# Patient Record
Sex: FEMALE | Race: WHITE | NOT HISPANIC OR LATINO | Employment: FULL TIME | URBAN - METROPOLITAN AREA
[De-identification: names, ages, dates, MRNs, and addresses within clinical notes are randomized per-mention and may not be internally consistent; named-entity substitution may affect disease eponyms.]

---

## 2023-04-03 ENCOUNTER — OFFICE VISIT (OUTPATIENT)
Dept: PRIMARY CARE | Facility: CLINIC | Age: 38
End: 2023-04-03
Payer: COMMERCIAL

## 2023-04-03 ENCOUNTER — TELEPHONE (OUTPATIENT)
Dept: PRIMARY CARE | Facility: CLINIC | Age: 38
End: 2023-04-03
Payer: COMMERCIAL

## 2023-04-03 VITALS
TEMPERATURE: 98.1 F | BODY MASS INDEX: 36.12 KG/M2 | SYSTOLIC BLOOD PRESSURE: 110 MMHG | HEART RATE: 68 BPM | HEIGHT: 60 IN | DIASTOLIC BLOOD PRESSURE: 70 MMHG | WEIGHT: 184 LBS

## 2023-04-03 DIAGNOSIS — G43.111 INTRACTABLE MIGRAINE WITH AURA WITH STATUS MIGRAINOSUS: Primary | ICD-10-CM

## 2023-04-03 PROCEDURE — 1036F TOBACCO NON-USER: CPT | Performed by: INTERNAL MEDICINE

## 2023-04-03 PROCEDURE — 99213 OFFICE O/P EST LOW 20 MIN: CPT | Performed by: INTERNAL MEDICINE

## 2023-04-03 RX ORDER — DICYCLOMINE HYDROCHLORIDE 20 MG/1
20 TABLET ORAL 3 TIMES DAILY PRN
COMMUNITY
Start: 2022-05-06 | End: 2024-01-04 | Stop reason: WASHOUT

## 2023-04-03 RX ORDER — RIZATRIPTAN BENZOATE 10 MG/1
10 TABLET ORAL ONCE AS NEEDED
Qty: 9 TABLET | Refills: 3 | Status: SHIPPED | OUTPATIENT
Start: 2023-04-03 | End: 2023-12-07 | Stop reason: ALTCHOICE

## 2023-04-03 RX ORDER — SODIUM FLUORIDE1.1%, POTASSIUM NITRATE 5% 5.8; 57.5 MG/ML; MG/ML
GEL, DENTIFRICE DENTAL
COMMUNITY
Start: 2023-01-30

## 2023-04-03 RX ORDER — EPINEPHRINE 0.3 MG/.3ML
1 INJECTION SUBCUTANEOUS
COMMUNITY
Start: 2019-10-10

## 2023-04-03 RX ORDER — ALBUTEROL SULFATE 90 UG/1
2 AEROSOL, METERED RESPIRATORY (INHALATION) EVERY 6 HOURS PRN
COMMUNITY
Start: 2023-01-13

## 2023-04-03 RX ORDER — ESCITALOPRAM OXALATE 10 MG/1
10 TABLET ORAL DAILY
COMMUNITY
Start: 2023-01-23 | End: 2023-07-28 | Stop reason: SDUPTHER

## 2023-04-03 ASSESSMENT — ENCOUNTER SYMPTOMS
CONSTITUTIONAL NEGATIVE: 1
RESPIRATORY NEGATIVE: 1
EYES NEGATIVE: 1
CARDIOVASCULAR NEGATIVE: 1
PSYCHIATRIC NEGATIVE: 1
HEADACHES: 1

## 2023-04-03 NOTE — ASSESSMENT & PLAN NOTE
Patient is having migraines which have intensified in the last few weeks she has lot of stress from the job.  She has been using caffeine to stay awake which is contributing she took good 2 Excedrin's yesterday without much effect.  Her most recent episode has been there for about 48 hours..  She is currently having a menstrual period and that also is a trigger factor.  We advised her to get good quality sleep minimize her caffeine and stimulants advised her to take magnesium oxide also I have sent prescription of Maxalt and she can repeat it in 2 hours also discussed with her about medication overuse headache she may eventually need referral to headache specialist Dr. Wang.Also maintain headache journal.

## 2023-04-03 NOTE — PROGRESS NOTES
Subjective   Patient ID: Teodora Allen is a 37 y.o. female who presents for Migraine (Patient here for migraine x 1 day, headache since Thursday, nausea vomiting , some vertigo along with light, noise and smell sensitivity).    Migraine          Review of Systems   Constitutional: Negative.    HENT: Negative.     Eyes: Negative.    Respiratory: Negative.     Cardiovascular: Negative.    Genitourinary: Negative.    Neurological:  Positive for headaches.   Psychiatric/Behavioral: Negative.     All other systems reviewed and are negative.      Objective   /70   Pulse 68   Temp 36.7 °C (98.1 °F) (Temporal)   Ht 1.524 m (5')   Wt 83.5 kg (184 lb)   LMP 04/02/2023 Comment: currently on  BMI 35.94 kg/m²     Physical Exam  Constitutional:       Appearance: Normal appearance.   HENT:      Head: Normocephalic and atraumatic.   Eyes:      Conjunctiva/sclera: Conjunctivae normal.      Pupils: Pupils are equal, round, and reactive to light.   Cardiovascular:      Rate and Rhythm: Normal rate and regular rhythm.      Heart sounds: Normal heart sounds.   Pulmonary:      Effort: Pulmonary effort is normal.      Breath sounds: Normal breath sounds.   Musculoskeletal:         General: Normal range of motion.   Neurological:      General: No focal deficit present.      Mental Status: She is alert and oriented to person, place, and time.   Psychiatric:         Mood and Affect: Mood normal.         Thought Content: Thought content normal.         Judgment: Judgment normal.         Assessment/Plan   Problem List Items Addressed This Visit          Other    Intractable migraine with aura with status migrainosus - Primary     Patient is having migraines which have intensified in the last few weeks she has lot of stress from the job.  She has been using caffeine to stay awake which is contributing she took good 2 Excedrin's yesterday without much effect.  Her most recent episode has been there for about 48 hours..  She is  currently having a menstrual period and that also is a trigger factor.  We advised her to get good quality sleep minimize her caffeine and stimulants advised her to take magnesium oxide also I have sent prescription of Maxalt and she can repeat it in 2 hours also discussed with her about medication overuse headache she may eventually need referral to headache specialist Dr. Wang.Also maintain headache journal.         Relevant Medications    rizatriptan (Maxalt) 10 mg tablet

## 2023-05-03 ENCOUNTER — APPOINTMENT (OUTPATIENT)
Dept: PRIMARY CARE | Facility: CLINIC | Age: 38
End: 2023-05-03
Payer: COMMERCIAL

## 2023-05-12 ENCOUNTER — APPOINTMENT (OUTPATIENT)
Dept: PRIMARY CARE | Facility: CLINIC | Age: 38
End: 2023-05-12
Payer: COMMERCIAL

## 2023-07-28 ENCOUNTER — OFFICE VISIT (OUTPATIENT)
Dept: PRIMARY CARE | Facility: CLINIC | Age: 38
End: 2023-07-28
Payer: COMMERCIAL

## 2023-07-28 VITALS
SYSTOLIC BLOOD PRESSURE: 110 MMHG | HEART RATE: 88 BPM | DIASTOLIC BLOOD PRESSURE: 70 MMHG | BODY MASS INDEX: 37.11 KG/M2 | WEIGHT: 189 LBS | HEIGHT: 60 IN | TEMPERATURE: 98 F | OXYGEN SATURATION: 98 %

## 2023-07-28 DIAGNOSIS — F41.9 ANXIETY: ICD-10-CM

## 2023-07-28 DIAGNOSIS — G43.839 INTRACTABLE MENSTRUAL MIGRAINE WITHOUT STATUS MIGRAINOSUS: Primary | ICD-10-CM

## 2023-07-28 PROBLEM — G43.009 MIGRAINE WITHOUT AURA: Status: ACTIVE | Noted: 2023-07-28

## 2023-07-28 PROBLEM — G43.009 MIGRAINE WITHOUT AURA: Status: RESOLVED | Noted: 2023-07-28 | Resolved: 2023-07-28

## 2023-07-28 PROCEDURE — 1036F TOBACCO NON-USER: CPT | Performed by: INTERNAL MEDICINE

## 2023-07-28 PROCEDURE — 99213 OFFICE O/P EST LOW 20 MIN: CPT | Performed by: INTERNAL MEDICINE

## 2023-07-28 RX ORDER — ESCITALOPRAM OXALATE 10 MG/1
10 TABLET ORAL DAILY
Qty: 90 TABLET | Refills: 3 | Status: SHIPPED | OUTPATIENT
Start: 2023-07-28 | End: 2023-11-06 | Stop reason: WASHOUT

## 2023-07-28 ASSESSMENT — ENCOUNTER SYMPTOMS
CARDIOVASCULAR NEGATIVE: 1
CONSTITUTIONAL NEGATIVE: 1
MUSCULOSKELETAL NEGATIVE: 1
RESPIRATORY NEGATIVE: 1
GASTROINTESTINAL NEGATIVE: 1

## 2023-07-28 NOTE — PROGRESS NOTES
"Subjective   Patient ID: Teodora Allen is a 37 y.o. female who presents for Follow-up (Routine fu and med refills).    Patient is here for medication refills and office visit her headaches have improved but she does have menstrual migraines and is on Times during that time she also is doing well on Lexapro for her anxiety.         Review of Systems   Constitutional: Negative.    HENT: Negative.     Respiratory: Negative.     Cardiovascular: Negative.    Gastrointestinal: Negative.    Genitourinary: Negative.    Musculoskeletal: Negative.    Neurological:         Headaches   All other systems reviewed and are negative.      Objective   /70 (BP Location: Left arm, Patient Position: Sitting, BP Cuff Size: Large adult)   Pulse 88   Temp 36.7 °C (98 °F) (Temporal)   Ht 1.518 m (4' 11.75\")   Wt 85.7 kg (189 lb)   SpO2 98%   BMI 37.22 kg/m²     Physical Exam  Vitals reviewed.   Constitutional:       Appearance: Normal appearance.   Eyes:      Conjunctiva/sclera: Conjunctivae normal.   Cardiovascular:      Rate and Rhythm: Normal rate and regular rhythm.      Heart sounds: Normal heart sounds.   Pulmonary:      Effort: Pulmonary effort is normal.      Breath sounds: Normal breath sounds.   Neurological:      General: No focal deficit present.      Mental Status: She is alert. Mental status is at baseline.   Psychiatric:         Mood and Affect: Mood normal.         Behavior: Behavior normal.       Assessment/Plan   Problem List Items Addressed This Visit       Headache, menstrual migraine, intractable - Primary    Anxiety    Relevant Medications    escitalopram (Lexapro) 10 mg tablet        We will refill Lexapro for her.  Migraines have improved.  She is avoiding excessive caffeine which is a trigger of factor for her migraines.  We recommend diet exercise weight loss to reduce BMI and cardiometabolic risk.  "

## 2023-11-05 PROBLEM — R10.84 ABDOMINAL PAIN, DIFFUSE: Status: ACTIVE | Noted: 2023-11-05

## 2023-11-05 PROBLEM — H92.09 EARACHE: Status: ACTIVE | Noted: 2023-11-05

## 2023-11-05 PROBLEM — T78.2XXA ANAPHYLAXIS: Status: ACTIVE | Noted: 2023-11-05

## 2023-11-05 PROBLEM — T78.40XA ALLERGIES: Status: ACTIVE | Noted: 2023-11-05

## 2023-11-06 ENCOUNTER — INITIAL PRENATAL (OUTPATIENT)
Dept: OBSTETRICS AND GYNECOLOGY | Facility: CLINIC | Age: 38
End: 2023-11-06
Payer: COMMERCIAL

## 2023-11-06 ENCOUNTER — APPOINTMENT (OUTPATIENT)
Dept: OBSTETRICS AND GYNECOLOGY | Facility: CLINIC | Age: 38
End: 2023-11-06
Payer: COMMERCIAL

## 2023-11-06 VITALS — DIASTOLIC BLOOD PRESSURE: 76 MMHG | SYSTOLIC BLOOD PRESSURE: 120 MMHG | WEIGHT: 194.5 LBS | BODY MASS INDEX: 38.3 KG/M2

## 2023-11-06 DIAGNOSIS — O99.210 OBESITY IN PREGNANCY (HHS-HCC): ICD-10-CM

## 2023-11-06 DIAGNOSIS — O09.511 PRIMIGRAVIDA OF ADVANCED MATERNAL AGE IN FIRST TRIMESTER (HHS-HCC): ICD-10-CM

## 2023-11-06 DIAGNOSIS — Z34.91 PRENATAL CARE IN FIRST TRIMESTER (HHS-HCC): Primary | ICD-10-CM

## 2023-11-06 PROBLEM — Z34.90 PRENATAL CARE, ANTEPARTUM (HHS-HCC): Status: ACTIVE | Noted: 2023-11-06

## 2023-11-06 LAB — PREGNANCY TEST URINE, POC: POSITIVE

## 2023-11-06 PROCEDURE — 88142 CYTOPATH C/V THIN LAYER: CPT

## 2023-11-06 PROCEDURE — 0500F INITIAL PRENATAL CARE VISIT: CPT | Performed by: ADVANCED PRACTICE MIDWIFE

## 2023-11-06 PROCEDURE — 87624 HPV HI-RISK TYP POOLED RSLT: CPT

## 2023-11-06 PROCEDURE — 87800 DETECT AGNT MULT DNA DIREC: CPT

## 2023-11-06 PROCEDURE — 81025 URINE PREGNANCY TEST: CPT | Performed by: ADVANCED PRACTICE MIDWIFE

## 2023-11-06 PROCEDURE — 87086 URINE CULTURE/COLONY COUNT: CPT

## 2023-11-06 ASSESSMENT — ENCOUNTER SYMPTOMS
NAUSEA: 1
CONSTIPATION: 1
APPETITE CHANGE: 1

## 2023-11-06 NOTE — PROGRESS NOTES
Subjective   Teodora Allen is a 38 y.o.  at 8w3d with a working estimated date of delivery of 2024, by Last Menstrual Period who presents for an initial prenatal visit. This pregnancy is unplanned, was not expected as she has not been using protection for a while now. She and her  are excited and happy.     Patient currently experiencing: nausea, declines anti-emetics, breast tenderness, constipation, fatigue  Bleeding or cramping since LMP: no  Taking prenatal vitamin: Yes  Ultrasound completed this pregnancy: No    Last pap: , no hx of abnl paps    OB History    Para Term  AB Living   1 0 0 0 0 0   SAB IAB Ectopic Multiple Live Births   0 0 0 0 0      # Outcome Date GA Lbr David/2nd Weight Sex Delivery Anes PTL Lv   1 Current                History of hypertension:  No    Past Medical History:   Diagnosis Date    Acid reflux     Endometriosis     IBS (irritable bowel syndrome)     Migraines     Multiple developmental ovarian cysts       Past Surgical History:   Procedure Laterality Date    APPENDECTOMY      CHOLECYSTECTOMY      TONSILLECTOMY          Physical Exam  Weight: 88.2 kg (194 lb 8 oz)  TW.041 kg (4 lb 8 oz)   Expected Total Weight Gain: 5 kg (11 lb)-9 kg (19 lb)   Pregravid BMI: 37.40  BP: 120/76    Review of Systems   Constitutional:  Positive for appetite change.   Gastrointestinal:  Positive for constipation and nausea.        OBGyn Exam See OB PE    Postpartum Depression: Not on file        Assessment   38 y.o.  at 8w3d   Continue PNVs daily  Pap collected, if normal, repeat in 5 yrs  AMA  ASA starting at 12 weeks  NIPTs with OB labs  Obesity  ASA starting at 12 weeks  Growth U/S at 30 & 36 weeks  BPP or NST weekly starting at 37 weeks  Hgb A1C with OB labs    Plan   NOB plan: New OB resources provided and reviewed with particular attention to dietary, travel, and medication restrictions  Oriented to practice, CNM vs. MD care  Reviewed IOM  recommendations for weight gain given pt's BMI: 11-20 pounds (BMI greater than or equal to 30)  Reviewed bleeding precautions, warning signs, when to call provider; phone number provided  Discussed bASA for PEC prophylaxis  Routine NOB labs ordered  Additional labs added: HgbA1C  NIPT discussed with patient: patient desires. Pre test genetic counseling discussed and included: Interpretation of family and medical histories to assess the probability of disease occurrence or recurrence; Education about inheritance, genetic testing, disease management, prevention and resources; Counseling to promote informed choices and adaptation to the risk or presented of a genetic condition; Counseling for psychological aspects of genetic testing.  Anatomy ultrasound ordered  Return in 4 weeks for routine prenatal care    ROME Casanova-JOSHUA

## 2023-11-07 LAB
BACTERIA UR CULT: NO GROWTH
C TRACH RRNA SPEC QL NAA+PROBE: NEGATIVE
N GONORRHOEA DNA SPEC QL PROBE+SIG AMP: NEGATIVE

## 2023-11-09 ENCOUNTER — TELEPHONE (OUTPATIENT)
Dept: OBSTETRICS AND GYNECOLOGY | Facility: CLINIC | Age: 38
End: 2023-11-09

## 2023-11-09 NOTE — TELEPHONE ENCOUNTER
8.6 wk ob left message on ob line asking if she is ok to have her hair colored in pregnancy.    Left message assuring patient that she is ok to have hair colored in a well ventilated area.  Patient advised to call back with any other questions or concerns.

## 2023-11-17 ENCOUNTER — ANCILLARY PROCEDURE (OUTPATIENT)
Dept: RADIOLOGY | Facility: CLINIC | Age: 38
End: 2023-11-17
Payer: COMMERCIAL

## 2023-11-17 DIAGNOSIS — Z34.91 PRENATAL CARE IN FIRST TRIMESTER (HHS-HCC): ICD-10-CM

## 2023-11-17 PROCEDURE — 76801 OB US < 14 WKS SINGLE FETUS: CPT | Performed by: OBSTETRICS & GYNECOLOGY

## 2023-11-17 PROCEDURE — 76801 OB US < 14 WKS SINGLE FETUS: CPT

## 2023-11-20 ENCOUNTER — LAB (OUTPATIENT)
Dept: LAB | Facility: LAB | Age: 38
End: 2023-11-20
Payer: COMMERCIAL

## 2023-11-20 DIAGNOSIS — Z34.91 PRENATAL CARE IN FIRST TRIMESTER (HHS-HCC): ICD-10-CM

## 2023-11-20 LAB
ABO GROUP (TYPE) IN BLOOD: NORMAL
ERYTHROCYTE [DISTWIDTH] IN BLOOD BY AUTOMATED COUNT: 14.7 % (ref 11.5–14.5)
EST. AVERAGE GLUCOSE BLD GHB EST-MCNC: 100 MG/DL
HBA1C MFR BLD: 5.1 %
HBV SURFACE AG SERPL QL IA: NONREACTIVE
HCT VFR BLD AUTO: 39.2 % (ref 36–46)
HCV AB SER QL: NONREACTIVE
HGB BLD-MCNC: 12.4 G/DL (ref 12–16)
HIV 1+2 AB+HIV1 P24 AG SERPL QL IA: NONREACTIVE
MCH RBC QN AUTO: 27.2 PG (ref 26–34)
MCHC RBC AUTO-ENTMCNC: 31.6 G/DL (ref 32–36)
MCV RBC AUTO: 86 FL (ref 80–100)
NRBC BLD-RTO: 0 /100 WBCS (ref 0–0)
PLATELET # BLD AUTO: 300 X10*3/UL (ref 150–450)
RBC # BLD AUTO: 4.56 X10*6/UL (ref 4–5.2)
RH FACTOR (ANTIGEN D): NORMAL
RUBV IGG SERPL IA-ACNC: 1 IA
RUBV IGG SERPL QL IA: POSITIVE
T PALLIDUM AB SER QL: NONREACTIVE
WBC # BLD AUTO: 8.9 X10*3/UL (ref 4.4–11.3)

## 2023-11-20 PROCEDURE — 36415 COLL VENOUS BLD VENIPUNCTURE: CPT

## 2023-11-20 PROCEDURE — 86780 TREPONEMA PALLIDUM: CPT

## 2023-11-20 PROCEDURE — 87340 HEPATITIS B SURFACE AG IA: CPT

## 2023-11-20 PROCEDURE — 86803 HEPATITIS C AB TEST: CPT

## 2023-11-20 PROCEDURE — 86901 BLOOD TYPING SEROLOGIC RH(D): CPT

## 2023-11-20 PROCEDURE — 86317 IMMUNOASSAY INFECTIOUS AGENT: CPT

## 2023-11-20 PROCEDURE — 85027 COMPLETE CBC AUTOMATED: CPT

## 2023-11-20 PROCEDURE — 87389 HIV-1 AG W/HIV-1&-2 AB AG IA: CPT

## 2023-11-20 PROCEDURE — 83036 HEMOGLOBIN GLYCOSYLATED A1C: CPT

## 2023-11-20 PROCEDURE — 86900 BLOOD TYPING SEROLOGIC ABO: CPT

## 2023-11-21 LAB — REFLEX ADDED, ANEMIA PANEL: NORMAL

## 2023-11-24 LAB
CYTOLOGY CMNT CVX/VAG CYTO-IMP: NORMAL
HPV HR 12 DNA GENITAL QL NAA+PROBE: NEGATIVE
HPV HR GENOTYPES PNL CVX NAA+PROBE: NEGATIVE
HPV16 DNA SPEC QL NAA+PROBE: NEGATIVE
HPV18 DNA SPEC QL NAA+PROBE: NEGATIVE
LAB AP HPV GENOTYPE QUESTION: YES
LAB AP HPV HR: NORMAL
LABORATORY COMMENT REPORT: NORMAL
LABORATORY COMMENT REPORT: NORMAL
LMP START DATE: NORMAL
MENSTRUAL HX REPORTED: NORMAL
PATH REPORT.TOTAL CANCER: NORMAL

## 2023-12-07 ENCOUNTER — ROUTINE PRENATAL (OUTPATIENT)
Dept: OBSTETRICS AND GYNECOLOGY | Facility: CLINIC | Age: 38
End: 2023-12-07
Payer: COMMERCIAL

## 2023-12-07 VITALS — DIASTOLIC BLOOD PRESSURE: 62 MMHG | WEIGHT: 194 LBS | SYSTOLIC BLOOD PRESSURE: 122 MMHG | BODY MASS INDEX: 38.21 KG/M2

## 2023-12-07 DIAGNOSIS — O26.891 HEARTBURN DURING PREGNANCY IN FIRST TRIMESTER (HHS-HCC): Primary | ICD-10-CM

## 2023-12-07 DIAGNOSIS — O09.511 PRIMIGRAVIDA OF ADVANCED MATERNAL AGE IN FIRST TRIMESTER (HHS-HCC): ICD-10-CM

## 2023-12-07 DIAGNOSIS — R12 HEARTBURN DURING PREGNANCY IN FIRST TRIMESTER (HHS-HCC): Primary | ICD-10-CM

## 2023-12-07 PROCEDURE — 0501F PRENATAL FLOW SHEET: CPT | Performed by: ADVANCED PRACTICE MIDWIFE

## 2023-12-07 RX ORDER — ASPIRIN 81 MG/1
162 TABLET ORAL DAILY
Qty: 60 TABLET | Refills: 11 | Status: SHIPPED | OUTPATIENT
Start: 2023-12-07 | End: 2024-12-06

## 2023-12-07 RX ORDER — FAMOTIDINE 20 MG/1
20 TABLET, FILM COATED ORAL 2 TIMES DAILY
Qty: 90 TABLET | Refills: 3 | Status: SHIPPED | OUTPATIENT
Start: 2023-12-07 | End: 2024-12-06

## 2023-12-07 NOTE — PROGRESS NOTES
Subjective   Teodora Allen is a 38 y.o.  at 12w6d with a working estimated date of delivery of 2024, by Last Menstrual Period who presents for a routine prenatal visit. She denies vaginal bleeding, abdominal pain, leakage of fluid.     Heartburn not relieved with Tums, prescription for Pepcid sent to pharmacy.  Started taking 162 mg ASA for PEC ppx.   13 wk U/S tomorrow  Reviewed labs, WNLs, NIPTs, WNLs, having a girl!    Her pregnancy is complicated by:  Pregnancy Problems (from 23 to present)       Problem Noted Resolved    Primigravida of advanced maternal age in first trimester 2023 by SUNSHINE Casanova No    Priority:  Medium      Obesity in pregnancy 2023 by SUNSHINE Casanova No    Priority:  Medium      Prenatal care, antepartum 2023 by SUNSHINE Casanova No    Priority:  Medium         Objective   Physical Exam  Weight: 88 kg (194 lb), Pregravid BMI: 37.40  TW.814 kg (4 lb)   Expected Total Weight Gain: 5 kg (11 lb)-9 kg (19 lb)   BP: 122/62  Fetal Heart Rate: 152      Postpartum Depression: Not on file        Prenatal Labs  Lab Results   Component Value Date    HGB 12.4 2023    HCT 39.2 2023     2023    ABO A 2023    LABRH POS 2023    NEISSGONOAMP Negative 2023    CHLAMTRACAMP Negative 2023    SYPHT Nonreactive 2023    HEPBSAG Nonreactive 2023    HIV1X2 Nonreactive 2023    URINECULTURE No growth 2023     NIPT: normal, having a girl    Assessment/Plan   38 y.o.  at 12w6d  Continue prenatal vitamins & ASA daily  NT scan tomorrow    Follow up in 4 weeks for a routine prenatal visit.    SUNSHINE Casanova

## 2023-12-08 ENCOUNTER — ANCILLARY PROCEDURE (OUTPATIENT)
Dept: RADIOLOGY | Facility: CLINIC | Age: 38
End: 2023-12-08
Payer: COMMERCIAL

## 2023-12-08 ENCOUNTER — APPOINTMENT (OUTPATIENT)
Dept: PRIMARY CARE | Facility: CLINIC | Age: 38
End: 2023-12-08
Payer: COMMERCIAL

## 2023-12-08 DIAGNOSIS — Z34.90 ENCOUNTER FOR SUPERVISION OF NORMAL PREGNANCY, UNSPECIFIED, UNSPECIFIED TRIMESTER (HHS-HCC): ICD-10-CM

## 2023-12-08 PROCEDURE — 76813 OB US NUCHAL MEAS 1 GEST: CPT

## 2023-12-13 ENCOUNTER — TELEPHONE (OUTPATIENT)
Dept: OBSTETRICS AND GYNECOLOGY | Facility: CLINIC | Age: 38
End: 2023-12-13

## 2023-12-13 NOTE — TELEPHONE ENCOUNTER
13.5 wk ob called ob line stating that she had her 13 week scan on 12/8, had been told that they couldn't get good measurements but they didn't see any abnormalities.  With patient having a negative NIPS, AIDANM wasn't concerned.  On her way out of her appointment she scheduled her 20 week anatomy scan.  Today she keeps getting reminders to call and schedule another ultrasound.  Patient is wondering if there was something abnormal seen on her NT scan?  Patient assured that I will send a message to Sheila to review ultrasound report and give her a call back.  Please advise

## 2024-01-04 ENCOUNTER — ROUTINE PRENATAL (OUTPATIENT)
Dept: OBSTETRICS AND GYNECOLOGY | Facility: CLINIC | Age: 39
End: 2024-01-04
Payer: COMMERCIAL

## 2024-01-04 VITALS — DIASTOLIC BLOOD PRESSURE: 80 MMHG | WEIGHT: 192.4 LBS | SYSTOLIC BLOOD PRESSURE: 122 MMHG | BODY MASS INDEX: 37.89 KG/M2

## 2024-01-04 DIAGNOSIS — Z3A.17 17 WEEKS GESTATION OF PREGNANCY (HHS-HCC): Primary | ICD-10-CM

## 2024-01-04 PROCEDURE — 0501F PRENATAL FLOW SHEET: CPT | Performed by: ADVANCED PRACTICE MIDWIFE

## 2024-01-04 NOTE — PROGRESS NOTES
Subjective     Teodora Allen is a 38 y.o.  at 17w0d with a working estimated date of delivery of 2024, by Last Menstrual Period who presents for a routine prenatal visit. No OB complaints or concerns.     Pt got Flu and Covid Vaccine: 2023    Her pregnancy is complicated by:  Pregnancy Problems (from 23 to present)       Problem Noted Resolved    Primigravida of advanced maternal age in first trimester 2023 by SUNSHINE Casanova No    Priority:  Medium      Obesity in pregnancy 2023 by SUNSHINE Casanova No    Priority:  Medium      Prenatal care, antepartum 2023 by SUNSHINE Casanova No    Priority:  Medium         Objective   Physical Exam:   Weight: 87.3 kg (192 lb 6.4 oz)  TW.089 kg (2 lb 6.4 oz)  Expected Total Weight Gain: 5 kg (11 lb)-9 kg (19 lb)   Pregravid BMI: 37.40  BP: 122/80  Fetal Heart Rate: 150               Postpartum Depression: Not on file        Prenatal Labs  Lab Results   Component Value Date    HGB 12.4 2023    HCT 39.2 2023     2023    ABO A 2023    LABRH POS 2023    NEISSGONOAMP Negative 2023    CHLAMTRACAMP Negative 2023    SYPHT Nonreactive 2023    HEPBSAG Nonreactive 2023    HIV1X2 Nonreactive 2023    URINECULTURE No growth 2023       Assessment/Plan   38 y.o.  at 17w0d  Continue prenatal vitamins and ASA daily  Reviewed recommended vaccinations in pregnancy, Accepts Flu vaccine and Covid vaccine, received last month  Anatomy U/S scheduled    Follow up in 4 week(s) for a routine prenatal visit or sooner as needed.    SUNSHINE Casanova

## 2024-01-26 ENCOUNTER — HOSPITAL ENCOUNTER (OUTPATIENT)
Dept: RADIOLOGY | Facility: CLINIC | Age: 39
Discharge: HOME | End: 2024-01-26
Payer: COMMERCIAL

## 2024-01-26 DIAGNOSIS — Z34.90 ENCOUNTER FOR SUPERVISION OF NORMAL PREGNANCY, UNSPECIFIED, UNSPECIFIED TRIMESTER (HHS-HCC): ICD-10-CM

## 2024-01-26 PROCEDURE — 76811 OB US DETAILED SNGL FETUS: CPT | Performed by: OBSTETRICS & GYNECOLOGY

## 2024-01-26 PROCEDURE — 76811 OB US DETAILED SNGL FETUS: CPT

## 2024-02-01 ENCOUNTER — ROUTINE PRENATAL (OUTPATIENT)
Dept: OBSTETRICS AND GYNECOLOGY | Facility: CLINIC | Age: 39
End: 2024-02-01
Payer: COMMERCIAL

## 2024-02-01 VITALS
DIASTOLIC BLOOD PRESSURE: 68 MMHG | WEIGHT: 195.13 LBS | BODY MASS INDEX: 38.43 KG/M2 | SYSTOLIC BLOOD PRESSURE: 124 MMHG

## 2024-02-01 DIAGNOSIS — Z3A.20 20 WEEKS GESTATION OF PREGNANCY (HHS-HCC): Primary | ICD-10-CM

## 2024-02-01 PROCEDURE — 0501F PRENATAL FLOW SHEET: CPT | Performed by: ADVANCED PRACTICE MIDWIFE

## 2024-02-01 NOTE — ASSESSMENT & PLAN NOTE
Started seeing DEANNE for infertility, then conceived spontaneously  Obesity  Pregravid BMI 37.4  Weekly NSTs at 37 weeks  Growth U/S at 30 & 36 weeks  AMA  39yo  162 mg Aspirin daily  Anxiety  Migraines  Anterior placenta

## 2024-02-01 NOTE — PROGRESS NOTES
Subjective     Teodora Allen is a 38 y.o.  at 20w6d with a working estimated date of delivery of 2024, by Last Menstrual Period who presents for a routine prenatal visit. No OB complaints or concerns.     Thinks she may have felt fetal movement.    Her pregnancy is complicated by:  Pregnancy Problems (from 23 to present)       Problem Noted Resolved    Primigravida of advanced maternal age in first trimester 2023 by SUNSHINE Casanova No    Priority:  Medium      Obesity in pregnancy 2023 by SUNSHINE Casanova No    Priority:  Medium      Prenatal care, antepartum 2023 by SUNSHINE Casanova No    Priority:  Medium         Objective   Physical Exam:   Weight: 88.5 kg (195 lb 2 oz)  TW.325 kg (5 lb 2 oz)  Expected Total Weight Gain: 5 kg (11 lb)-9 kg (19 lb)   Pregravid BMI: 37.40  BP: 124/68  Fetal Heart Rate: 140 Fundal Height (cm): 22 cm             Postpartum Depression: Not on file        Prenatal Labs  Lab Results   Component Value Date    HGB 12.4 2023    HCT 39.2 2023     2023    ABO A 2023    LABRH POS 2023    NEISSGONOAMP Negative 2023    CHLAMTRACAMP Negative 2023    SYPHT Nonreactive 2023    HEPBSAG Nonreactive 2023    HIV1X2 Nonreactive 2023    URINECULTURE No growth 2023       Assessment/Plan   38 y.o.  at 20w6d  Continue prenatal vitamins  Anatomy U/S completed, WNLs  Anterior placenta  1 hr and CBC next visit, supplies given to pt    Follow up in 4 week(s) for a routine prenatal visit or sooner as needed.    SUNSHINE Casanova

## 2024-02-19 ENCOUNTER — TELEPHONE (OUTPATIENT)
Dept: OBSTETRICS AND GYNECOLOGY | Facility: CLINIC | Age: 39
End: 2024-02-19
Payer: COMMERCIAL

## 2024-02-19 ENCOUNTER — HOSPITAL ENCOUNTER (OUTPATIENT)
Dept: RADIOLOGY | Facility: EXTERNAL LOCATION | Age: 39
Discharge: HOME | End: 2024-02-19

## 2024-02-19 DIAGNOSIS — S99.922A FOOT INJURY, LEFT, INITIAL ENCOUNTER: ICD-10-CM

## 2024-02-19 NOTE — TELEPHONE ENCOUNTER
Pt called IB line at 23-3 weeks with c/o swelling in her left foot and ankle. States about 3 weeks ago, she took a fall and hurt her left ankle , did not think anything of it at the time because it was not bothersome. Last Monday, she got out her car and stepped into a large pot hole with the same ankle twisting it slightly. Again, it hurt but did not think much of it. Since last Monday, she has been wearing her compression stockings because she has been having tingling and numbness with some swelling that comes and goes. No pitting or redness in calf.  States on Thursday of last week is when the pain got worse in her foot/ankle. States she has been icing and elevating as much as possible, it is hard to bare any weight or put pressure on that foot/ankle. She is not concerned for DVT since her mother has hx of blood clots and knows what to look for, and because she mainly states that the pain is in the ankle/foot. Advised that she visits an urgent care for imaging and assessment. Advised to report back with results and that I would pass along this message to you for additional comments and/or plan of care. Please advise.

## 2024-02-29 ENCOUNTER — ROUTINE PRENATAL (OUTPATIENT)
Dept: OBSTETRICS AND GYNECOLOGY | Facility: CLINIC | Age: 39
End: 2024-02-29
Payer: COMMERCIAL

## 2024-02-29 VITALS — SYSTOLIC BLOOD PRESSURE: 114 MMHG | WEIGHT: 196.5 LBS | DIASTOLIC BLOOD PRESSURE: 76 MMHG | BODY MASS INDEX: 38.7 KG/M2

## 2024-02-29 DIAGNOSIS — Z3A.24 24 WEEKS GESTATION OF PREGNANCY (HHS-HCC): Primary | ICD-10-CM

## 2024-02-29 DIAGNOSIS — Z13.1 SCREENING FOR DIABETES MELLITUS: ICD-10-CM

## 2024-02-29 LAB
ERYTHROCYTE [DISTWIDTH] IN BLOOD BY AUTOMATED COUNT: 13.6 % (ref 11.5–14.5)
GLUCOSE 1H P 50 G GLC PO SERPL-MCNC: 90 MG/DL
HCT VFR BLD AUTO: 40.2 % (ref 36–46)
HGB BLD-MCNC: 12.5 G/DL (ref 12–16)
MCH RBC QN AUTO: 28.2 PG (ref 26–34)
MCHC RBC AUTO-ENTMCNC: 31.1 G/DL (ref 32–36)
MCV RBC AUTO: 91 FL (ref 80–100)
NRBC BLD-RTO: 0 /100 WBCS (ref 0–0)
PLATELET # BLD AUTO: 268 X10*3/UL (ref 150–450)
RBC # BLD AUTO: 4.43 X10*6/UL (ref 4–5.2)
REFLEX ADDED, ANEMIA PANEL: NORMAL
WBC # BLD AUTO: 12.3 X10*3/UL (ref 4.4–11.3)

## 2024-02-29 PROCEDURE — 82947 ASSAY GLUCOSE BLOOD QUANT: CPT

## 2024-02-29 PROCEDURE — 0501F PRENATAL FLOW SHEET: CPT | Performed by: ADVANCED PRACTICE MIDWIFE

## 2024-02-29 PROCEDURE — 85027 COMPLETE CBC AUTOMATED: CPT

## 2024-02-29 PROCEDURE — 36415 COLL VENOUS BLD VENIPUNCTURE: CPT

## 2024-02-29 NOTE — ASSESSMENT & PLAN NOTE
AMA 37yo  Obesity  PreGravid BMI 37  Weekly NSTs at 37 weeks  Aspirin for PEC prevention   AMA  G1  Obesity  Anxiety  Migraines  Heartburn  Pepcid   Antibiotics started

## 2024-02-29 NOTE — PROGRESS NOTES
"Subjective     Teodora Allen is a 38 y.o.  at 24w6d with a working estimated date of delivery of 2024, by Last Menstrual Period who presents for a routine prenatal visit. Endorses good fetal movement, denies vaginal bleeding, leakage of fluid, or painful cramping/contractions.    1 hr and CBC today  Anatomy U/S WNLS, repeat at 30 wks  Discussed TDAP next visit, agrees to administration    Her pregnancy is complicated by:  Pregnancy Problems (from 23 to present)       Problem Noted Resolved    Primigravida of advanced maternal age in first trimester 2023 by SUNSHINE Casanova No    Priority:  Medium      Obesity in pregnancy 2023 by SUNSHINE Casanova No    Priority:  Medium      Pregnancy 2023 by SUNSHINE Casanova No    Priority:  Medium       Objective   Physical Exam:   Weight: 89.1 kg (196 lb 8 oz)  TW.948 kg (6 lb 8 oz)  Expected Total Weight Gain: 5 kg (11 lb)-9 kg (19 lb)   Pregravid BMI: 37.40  BP: 114/76  Fetal Heart Rate: 142 Fundal Height (cm): 27 cm             Postpartum Depression: Not on file        Prenatal Labs  Lab Results   Component Value Date    HGB 12.4 2023    HCT 39.2 2023     2023    ABO A 2023    LABRH POS 2023    NEISSGONOAMP Negative 2023    CHLAMTRACAMP Negative 2023    SYPHT Nonreactive 2023    HEPBSAG Nonreactive 2023    HIV1X2 Nonreactive 2023    URINECULTURE No growth 2023     No results found for: \"GLUC1P\"    Assessment/Plan   38 y.o.  at 24w6d  Continue prenatal vitamins  Reviewed recommended vaccinations in pregnancy, Accepts TDAP vaccine    Follow up in 4 week(s) for a routine prenatal visit or sooner as needed.    SUNSHINE Casanova  "

## 2024-03-22 ENCOUNTER — TELEPHONE (OUTPATIENT)
Dept: OBSTETRICS AND GYNECOLOGY | Facility: CLINIC | Age: 39
End: 2024-03-22
Payer: COMMERCIAL

## 2024-03-22 DIAGNOSIS — O26.891 HEARTBURN DURING PREGNANCY IN FIRST TRIMESTER (HHS-HCC): Primary | ICD-10-CM

## 2024-03-22 DIAGNOSIS — R12 HEARTBURN DURING PREGNANCY IN FIRST TRIMESTER (HHS-HCC): Primary | ICD-10-CM

## 2024-03-22 RX ORDER — OMEPRAZOLE 20 MG/1
20 CAPSULE, DELAYED RELEASE ORAL DAILY
Qty: 90 CAPSULE | Refills: 3 | Status: SHIPPED | OUTPATIENT
Start: 2024-03-22 | End: 2025-03-22

## 2024-03-22 NOTE — TELEPHONE ENCOUNTER
Pt called OB line at 28-0 stating that she is currently taking 40mg of Pepcid each day and the maximum amount of TUMS each day without any relief. Wants to know what else she can do to help. Message sent to Zan on call, advised to start Prilosec 20mg daily. Rx sent by Zan. Pt notified and will start tomorrow. Advised to call the office if no relief by Monday, otherwise to follow up at appointment on Thursday. All questions answered.

## 2024-03-28 ENCOUNTER — ROUTINE PRENATAL (OUTPATIENT)
Dept: OBSTETRICS AND GYNECOLOGY | Facility: CLINIC | Age: 39
End: 2024-03-28
Payer: COMMERCIAL

## 2024-03-28 VITALS
SYSTOLIC BLOOD PRESSURE: 112 MMHG | WEIGHT: 203.38 LBS | BODY MASS INDEX: 40.05 KG/M2 | DIASTOLIC BLOOD PRESSURE: 70 MMHG

## 2024-03-28 DIAGNOSIS — Z3A.28 28 WEEKS GESTATION OF PREGNANCY (HHS-HCC): Primary | ICD-10-CM

## 2024-03-28 DIAGNOSIS — Z23 NEED FOR DIPHTHERIA-TETANUS-PERTUSSIS (TDAP) VACCINE: ICD-10-CM

## 2024-03-28 PROCEDURE — 90715 TDAP VACCINE 7 YRS/> IM: CPT | Performed by: ADVANCED PRACTICE MIDWIFE

## 2024-03-28 PROCEDURE — 0501F PRENATAL FLOW SHEET: CPT | Performed by: ADVANCED PRACTICE MIDWIFE

## 2024-03-28 PROCEDURE — 90471 IMMUNIZATION ADMIN: CPT | Performed by: ADVANCED PRACTICE MIDWIFE

## 2024-03-28 ASSESSMENT — EDINBURGH POSTNATAL DEPRESSION SCALE (EPDS)
TOTAL SCORE: 2
I HAVE BLAMED MYSELF UNNECESSARILY WHEN THINGS WENT WRONG: NOT VERY OFTEN
I HAVE BEEN SO UNHAPPY THAT I HAVE HAD DIFFICULTY SLEEPING: NOT AT ALL
I HAVE LOOKED FORWARD WITH ENJOYMENT TO THINGS: AS MUCH AS I EVER DID
I HAVE BEEN SO UNHAPPY THAT I HAVE BEEN CRYING: NO, NEVER
I HAVE FELT SAD OR MISERABLE: NO, NOT AT ALL
THE THOUGHT OF HARMING MYSELF HAS OCCURRED TO ME: NEVER
I HAVE BEEN ABLE TO LAUGH AND SEE THE FUNNY SIDE OF THINGS: AS MUCH AS I ALWAYS COULD
THINGS HAVE BEEN GETTING ON TOP OF ME: NO, I HAVE BEEN COPING AS WELL AS EVER
I HAVE BEEN ANXIOUS OR WORRIED FOR NO GOOD REASON: HARDLY EVER
I HAVE FELT SCARED OR PANICKY FOR NO GOOD REASON: NO, NOT AT ALL

## 2024-03-28 NOTE — PROGRESS NOTES
Subjective     Teodora Allen is a 38 y.o.  at 28w6d with a working estimated date of delivery of 2024, by Last Menstrual Period who presents for a routine prenatal visit. Endorses good fetal movement, denies vaginal bleeding, leakage of fluid, or regular contractions.    Reviewed labs, passed 1°, no anemia.  Growth U/S scheduled next week  Discussed TDAP and RSV vaccines, not eligible for RSV, agrees to TDAP today.  Heartburn much improved with Prilosec.    Her pregnancy is complicated by:  Pregnancy Problems (from 23 to present)       Problem Noted Resolved    Primigravida of advanced maternal age in first trimester 2023 by SUNSHINE Casanova No    Priority:  Medium      Obesity in pregnancy 2023 by SUNSHINE Casanova No    Priority:  Medium      Pregnancy 2023 by SUNSHINE Casanova No    Priority:  Medium       Objective   Physical Exam:   Weight: 92.3 kg (203 lb 6 oz)  TW.067 kg (13 lb 6 oz)  Expected Total Weight Gain: 5 kg (11 lb)-9 kg (19 lb)   Pregravid BMI: 37.40  BP: 112/70  Fetal Heart Rate: 138 Fundal Height (cm): 30 cm             Postpartum Depression: Low Risk  (3/28/2024)    [place]  Depression Scale     Last EPDS Total Score: 2     Last EPDS Self Harm Result: Never        Prenatal Labs  Lab Results   Component Value Date    HGB 12.5 2024    HCT 40.2 2024     2024    ABO A 2023    LABRH POS 2023    NEISSGONOAMP Negative 2023    CHLAMTRACAMP Negative 2023    SYPHT Nonreactive 2023    HEPBSAG Nonreactive 2023    HIV1X2 Nonreactive 2023    URINECULTURE No growth 2023     Lab Results   Component Value Date    GLUC1P 90 2024       Assessment/Plan   38 y.o.  at 28w6d  Continue prenatal vitamins  Reviewed recommended vaccinations in pregnancy, Accepts TDAP vaccine    Follow up in 2 week(s) for a routine prenatal visit or sooner as needed.    Sheila  ROME Zafar-JOSHUA

## 2024-04-05 ENCOUNTER — HOSPITAL ENCOUNTER (OUTPATIENT)
Dept: RADIOLOGY | Facility: CLINIC | Age: 39
Discharge: HOME | End: 2024-04-05
Payer: COMMERCIAL

## 2024-04-05 DIAGNOSIS — Z34.90 ENCOUNTER FOR SUPERVISION OF NORMAL PREGNANCY, UNSPECIFIED, UNSPECIFIED TRIMESTER (HHS-HCC): ICD-10-CM

## 2024-04-05 PROCEDURE — 76816 OB US FOLLOW-UP PER FETUS: CPT

## 2024-04-05 PROCEDURE — 76816 OB US FOLLOW-UP PER FETUS: CPT | Performed by: OBSTETRICS & GYNECOLOGY

## 2024-04-05 PROCEDURE — 76819 FETAL BIOPHYS PROFIL W/O NST: CPT

## 2024-04-05 PROCEDURE — 76819 FETAL BIOPHYS PROFIL W/O NST: CPT | Performed by: OBSTETRICS & GYNECOLOGY

## 2024-04-10 ENCOUNTER — ROUTINE PRENATAL (OUTPATIENT)
Dept: OBSTETRICS AND GYNECOLOGY | Facility: CLINIC | Age: 39
End: 2024-04-10
Payer: COMMERCIAL

## 2024-04-10 VITALS — WEIGHT: 204.6 LBS | DIASTOLIC BLOOD PRESSURE: 64 MMHG | SYSTOLIC BLOOD PRESSURE: 110 MMHG | BODY MASS INDEX: 40.29 KG/M2

## 2024-04-10 DIAGNOSIS — O09.523 MULTIGRAVIDA OF ADVANCED MATERNAL AGE IN THIRD TRIMESTER (HHS-HCC): ICD-10-CM

## 2024-04-10 DIAGNOSIS — Z3A.30 30 WEEKS GESTATION OF PREGNANCY (HHS-HCC): Primary | ICD-10-CM

## 2024-04-10 DIAGNOSIS — O99.210 OBESITY IN PREGNANCY (HHS-HCC): ICD-10-CM

## 2024-04-10 PROCEDURE — 0501F PRENATAL FLOW SHEET: CPT

## 2024-04-10 NOTE — PROGRESS NOTES
Subjective   Patient ID Teodora Allen is a 38 y.o.  at 30w5d with a working estimated date of delivery of 2024, by Last Menstrual Period who presents for a routine prenatal visit. She denies vaginal bleeding, leakage of fluid, decreased fetal movements, or contractions.    No concerns today.    Reviewed growth US and normal interval growth noted.    Patient reports good relief with prilosec.     Birth preferences, recommended readings, and cervical ripening sheet provided.       Objective   Physical Exam:      Expected Total Weight Gain: 5 kg (11 lb)-9 kg (19 lb)   Pregravid BMI: 37.40  6.623 kg (14 lb 9.6 oz)    Prenatal Labs  Urine Dip:  Lab Results   Component Value Date    KETONESU 20 (1+) (A) 2022     Lab Results   Component Value Date    HGB 12.5 2024    HCT 40.2 2024    ABO A 2023    HEPBSAG Nonreactive 2023         Assessment/Plan   Collect birth preferences at next visit.   Continue prenatal vitamin and ASA  Follow up in 2 weeks for a routine prenatal visit.

## 2024-04-24 ENCOUNTER — ROUTINE PRENATAL (OUTPATIENT)
Dept: OBSTETRICS AND GYNECOLOGY | Facility: CLINIC | Age: 39
End: 2024-04-24
Payer: COMMERCIAL

## 2024-04-24 VITALS
SYSTOLIC BLOOD PRESSURE: 118 MMHG | WEIGHT: 205.25 LBS | DIASTOLIC BLOOD PRESSURE: 72 MMHG | BODY MASS INDEX: 40.42 KG/M2

## 2024-04-24 DIAGNOSIS — O09.511 PRIMIGRAVIDA OF ADVANCED MATERNAL AGE IN FIRST TRIMESTER (HHS-HCC): ICD-10-CM

## 2024-04-24 DIAGNOSIS — O99.210 OBESITY IN PREGNANCY (HHS-HCC): ICD-10-CM

## 2024-04-24 DIAGNOSIS — Z3A.32 32 WEEKS GESTATION OF PREGNANCY (HHS-HCC): Primary | ICD-10-CM

## 2024-04-24 PROBLEM — R10.84 ABDOMINAL PAIN, DIFFUSE: Status: RESOLVED | Noted: 2023-11-05 | Resolved: 2024-04-24

## 2024-04-24 PROBLEM — H92.09 EARACHE: Status: RESOLVED | Noted: 2023-11-05 | Resolved: 2024-04-24

## 2024-04-24 PROCEDURE — 0501F PRENATAL FLOW SHEET: CPT

## 2024-04-24 NOTE — PROGRESS NOTES
Subjective     Teodora Allen is a 38 y.o.  at 32w5d with a working estimated date of delivery of 2024, by Last Menstrual Period who presents for a routine prenatal visit. She denies vaginal bleeding, leakage of fluid, decreased fetal movements, or contractions.    Patient reports overall feeling well.     Her pregnancy is complicated by:  Medical Problems      Patient Active Problem List   Diagnosis    Intractable migraine with aura with status migrainosus    Headache, menstrual migraine, intractable    Anxiety    Anaphylaxis    Allergies    Mononeuritis lower limb    Primigravida of advanced maternal age in first trimester ([facility])    Obesity in pregnancy ([facility])    Pregnancy ([facility])         Objective   Weight: 93.1 kg (205 lb 4 oz)  TW.917 kg (15 lb 4 oz)  Pregravid BMI: 37.40    BP: 118/72          Prenatal Labs:  Lab Results   Component Value Date    HGB 12.5 2024    HCT 40.2 2024     2024    ABO A 2023    LABRH POS 2023    NEISSGONOAMP Negative 2023    CHLAMTRACAMP Negative 2023    SYPHT Nonreactive 2023    HEPBSAG Nonreactive 2023    HIV1X2 Nonreactive 2023    URINECULTURE No growth 2023       Assessment/Plan     Follow up in 2 week for a routine prenatal visit.    SUNSHINE Doan

## 2024-05-09 ENCOUNTER — ROUTINE PRENATAL (OUTPATIENT)
Dept: OBSTETRICS AND GYNECOLOGY | Facility: CLINIC | Age: 39
End: 2024-05-09
Payer: COMMERCIAL

## 2024-05-09 VITALS
DIASTOLIC BLOOD PRESSURE: 82 MMHG | BODY MASS INDEX: 40.64 KG/M2 | WEIGHT: 206.38 LBS | SYSTOLIC BLOOD PRESSURE: 116 MMHG

## 2024-05-09 DIAGNOSIS — Z3A.34 34 WEEKS GESTATION OF PREGNANCY (HHS-HCC): Primary | ICD-10-CM

## 2024-05-09 PROCEDURE — 0501F PRENATAL FLOW SHEET: CPT | Performed by: ADVANCED PRACTICE MIDWIFE

## 2024-05-09 NOTE — PROGRESS NOTES
Subjective     Teodora Allen is a 38 y.o.  at 34w6d with a working estimated date of delivery of 2024, by Last Menstrual Period who presents for a routine prenatal visit. Endorses good fetal movement, denies vaginal bleeding, leakage of fluid, or regular contractions.  Reviewed birth preferences, IOL, cord blood collection vs delayed cord clamping. Teodora is open to anything and is very reasonable. Understands labor and birth is unpredictable and is willing to do whatever is recommended to keep herself and her baby safe. Expressed desire for low intervention, unmedicated labor and delivery. Reviewed Nitrous and how to use it for maximum efficacy.   Her pregnancy is complicated by:  Pregnancy Problems (from 23 to present)       Problem Noted Resolved    Primigravida of advanced maternal age in first trimester ([facility]) 2023 by SUNSHINE Casanova No    Priority:  Medium      Obesity in pregnancy ([facility]) 2023 by SUNSHINE Casanova No    Priority:  Medium      Overview Signed 2024  4:27 PM by SUNSHINE Doan     Growth US at 30 and 36 weeks  NST/BPP weekly beginning at 37 weeks  Recommendation for IOL b/w 39.0-39.6           Pregnancy ([facility]) 2023 by SUNSHINE Casanova No    Priority:  Medium       Objective   Physical Exam:   Weight: 93.6 kg (206 lb 6 oz)  TW.428 kg (16 lb 6 oz)  Expected Total Weight Gain: 5 kg (11 lb)-9 kg (19 lb)   Pregravid BMI: 37.40  BP: 116/82  Fetal Heart Rate: 132 Fundal Height (cm): 34 cm             Postpartum Depression: Low Risk  (3/28/2024)    [place]  Depression Scale     Last EPDS Total Score: 2     Last EPDS Self Harm Result: Never        Prenatal Labs  Lab Results   Component Value Date    HGB 12.5 2024    HCT 40.2 2024     2024    ABO A 2023    LABRH POS 2023    NEISSGONOAMP Negative 2023    CHLAMTRACAMP Negative 2023    SYPHT  Nonreactive 2023    HEPBSAG Nonreactive 2023    HIV1X2 Nonreactive 2023    URINECULTURE No growth 2023     Lab Results   Component Value Date    GLUC1P 90 2024       Assessment/Plan   38 y.o.  at 34w6d  Continue prenatal vitamins & aspirin daily  Growth U/S scheduled next week  GBS and labor consent next visit  NSTs starting at 37 weeks    Follow up in 2 week(s) for a routine prenatal visit or sooner as needed.    Sheila Zuluaga, ROME-JOSHUA

## 2024-05-16 ENCOUNTER — APPOINTMENT (OUTPATIENT)
Dept: OBSTETRICS AND GYNECOLOGY | Facility: CLINIC | Age: 39
End: 2024-05-16
Payer: COMMERCIAL

## 2024-05-17 ENCOUNTER — HOSPITAL ENCOUNTER (OUTPATIENT)
Dept: RADIOLOGY | Facility: CLINIC | Age: 39
Discharge: HOME | End: 2024-05-17
Payer: COMMERCIAL

## 2024-05-17 DIAGNOSIS — Z34.90 ENCOUNTER FOR SUPERVISION OF NORMAL PREGNANCY, UNSPECIFIED, UNSPECIFIED TRIMESTER (HHS-HCC): ICD-10-CM

## 2024-05-17 PROCEDURE — 76819 FETAL BIOPHYS PROFIL W/O NST: CPT

## 2024-05-17 PROCEDURE — 76816 OB US FOLLOW-UP PER FETUS: CPT

## 2024-05-17 PROCEDURE — 76819 FETAL BIOPHYS PROFIL W/O NST: CPT | Performed by: OBSTETRICS & GYNECOLOGY

## 2024-05-17 PROCEDURE — 76816 OB US FOLLOW-UP PER FETUS: CPT | Performed by: OBSTETRICS & GYNECOLOGY

## 2024-05-23 ENCOUNTER — ROUTINE PRENATAL (OUTPATIENT)
Dept: OBSTETRICS AND GYNECOLOGY | Facility: CLINIC | Age: 39
End: 2024-05-23
Payer: COMMERCIAL

## 2024-05-23 VITALS — WEIGHT: 208.5 LBS | BODY MASS INDEX: 41.06 KG/M2 | SYSTOLIC BLOOD PRESSURE: 124 MMHG | DIASTOLIC BLOOD PRESSURE: 80 MMHG

## 2024-05-23 DIAGNOSIS — O99.210 OBESITY IN PREGNANCY (HHS-HCC): Primary | ICD-10-CM

## 2024-05-23 DIAGNOSIS — Z3A.36 36 WEEKS GESTATION OF PREGNANCY (HHS-HCC): ICD-10-CM

## 2024-05-23 PROCEDURE — 87081 CULTURE SCREEN ONLY: CPT

## 2024-05-23 PROCEDURE — 59025 FETAL NON-STRESS TEST: CPT | Performed by: ADVANCED PRACTICE MIDWIFE

## 2024-05-23 PROCEDURE — 0501F PRENATAL FLOW SHEET: CPT | Performed by: ADVANCED PRACTICE MIDWIFE

## 2024-05-23 NOTE — PROGRESS NOTES
Subjective     Teodora Allen is a 38 y.o.  at 36w6d with a working estimated date of delivery of 2024, by Last Menstrual Period who presents for a routine prenatal visit. Endorses good fetal movement, denies vaginal bleeding, leakage of fluid, or regular contractions.    Discussed recommendation for IOL between 39.0-39.6 r/t BMI, pt verbalizes understanding and agrees to scheduling at 39.6 wks preferably to allow her body time to ripen and prepare.  Discussed NSTs weekly starting at 37 weeks r/t BMI, agrees to start today as she is 37 week tomorrow.   GBS collected today    Her pregnancy is complicated by:  Pregnancy Problems (from 23 to present)       Problem Noted Resolved    Primigravida of advanced maternal age in first trimester ([facility]) 2023 by SUNSHINE Casanova No    Priority:  Medium      Obesity in pregnancy ([facility]) 2023 by SUNSHINE Casanova No    Priority:  Medium      Overview Signed 2024  4:27 PM by SUNSHINE Doan     Growth US at 30 and 36 weeks  NST/BPP weekly beginning at 37 weeks  Recommendation for IOL b/w 39.0-39.6           Pregnancy ([facility]) 2023 by SUNSHINE Casanova No    Priority:  Medium       Objective   Physical Exam:   Weight: 94.6 kg (208 lb 8 oz)  TW.392 kg (18 lb 8 oz)  Expected Total Weight Gain: 5 kg (11 lb)-9 kg (19 lb)   Pregravid BMI: 37.40  BP: 124/80         Dilation: Closed Effacement (%): 0 Fetal Station: -3    Postpartum Depression: Low Risk  (3/28/2024)    [place]  Depression Scale     Last EPDS Total Score: 2     Last EPDS Self Harm Result: Never        Prenatal Labs  Lab Results   Component Value Date    HGB 12.5 2024    HCT 40.2 2024     2024    ABO A 2023    LABRH POS 2023    NEISSGONOAMP Negative 2023    CHLAMTRACAMP Negative 2023    SYPHT Nonreactive 2023    HEPBSAG Nonreactive 2023    HIV1X2 Nonreactive  "2023    URINECULTURE No growth 2023     Lab Results   Component Value Date    GLUC1P 90 2024     No results found for: \"GRPBSTREP\"     Assessment/Plan   38 y.o.  at 36w6d  Continue prenatal vitamins & ASA daily  GBS pending  Reactive NST      Follow up in 1 week(s) for a routine prenatal visit or sooner as needed.    ROME Casanova-NENAM  "

## 2024-05-23 NOTE — PROCEDURES
Teodora Galvezer, a  at 36w6d with an BENITO of 2024, by Last Menstrual Period, was seen at [facility] for a nonstress test for obesity.    Non-Stress Test   Baseline Fetal Heart Rate for Non-Stress Test: 135 BPM  Variability in Waveform for Non-Stress Test: Moderate  Accelerations in Non-Stress Test: Yes, lasting at least 15 seconds  Decelerations in Non-Stress Test: None  Contractions in Non-Stress Test: Irregular  NST Contraction Frequency: 4-6 mins  Acoustic Stimulator for Non-Stress Test: No  Interpretation of Non-Stress Test   Interpretation of Non-Stress Test: Reactive      Sheila Zuluaga, ROME-JOSHUA

## 2024-05-26 LAB — GP B STREP GENITAL QL CULT: NORMAL

## 2024-05-28 DIAGNOSIS — Z3A.39 39 WEEKS GESTATION OF PREGNANCY (HHS-HCC): ICD-10-CM

## 2024-05-28 DIAGNOSIS — O99.210 OBESITY IN PREGNANCY (HHS-HCC): Primary | ICD-10-CM

## 2024-05-30 ENCOUNTER — ROUTINE PRENATAL (OUTPATIENT)
Dept: OBSTETRICS AND GYNECOLOGY | Facility: CLINIC | Age: 39
End: 2024-05-30
Payer: COMMERCIAL

## 2024-05-30 VITALS — BODY MASS INDEX: 41.85 KG/M2 | WEIGHT: 212.5 LBS | DIASTOLIC BLOOD PRESSURE: 80 MMHG | SYSTOLIC BLOOD PRESSURE: 126 MMHG

## 2024-05-30 DIAGNOSIS — O99.210 OBESITY IN PREGNANCY (HHS-HCC): Primary | ICD-10-CM

## 2024-05-30 DIAGNOSIS — Z3A.37 37 WEEKS GESTATION OF PREGNANCY (HHS-HCC): ICD-10-CM

## 2024-05-30 PROCEDURE — 59025 FETAL NON-STRESS TEST: CPT | Performed by: ADVANCED PRACTICE MIDWIFE

## 2024-05-30 PROCEDURE — 0501F PRENATAL FLOW SHEET: CPT | Performed by: ADVANCED PRACTICE MIDWIFE

## 2024-05-30 NOTE — PROCEDURES
Teodora Galvezer, a  at 37w6d with an BENITO of 2024, by Last Menstrual Period, was seen at [facility] for a nonstress test for obesity.    Non-Stress Test   Baseline Fetal Heart Rate for Non-Stress Test: 140 BPM  Variability in Waveform for Non-Stress Test: Moderate  Accelerations in Non-Stress Test: Yes, greater than/equal to 15 bpm  Decelerations in Non-Stress Test: None  Contractions in Non-Stress Test: Irregular  NST Contraction Frequency: 4-5  Acoustic Stimulator for Non-Stress Test: No  Interpretation of Non-Stress Test   Interpretation of Non-Stress Test: Reactive     Sheila Zuluaga, ROME-JOSHUA

## 2024-05-30 NOTE — PROGRESS NOTES
Subjective     Teodora Allen is a 38 y.o.  at 37w6d with a working estimated date of delivery of 2024, by Last Menstrual Period who presents for a routine prenatal visit. Endorses good fetal movement, denies vaginal bleeding, leakage of fluid, or regular contractions.    IOL scheduled   GBS neg    Her pregnancy is complicated by:  Pregnancy Problems (from 23 to present)       Problem Noted Resolved    Primigravida of advanced maternal age in first trimester ([facility]) 2023 by SUNSHINE Casanova No    Priority:  Medium      Obesity in pregnancy ([facility]) 2023 by SUNSHINE Casanova No    Priority:  Medium      Overview Signed 2024  4:27 PM by SUNSHINE Doan     Growth US at 30 and 36 weeks  NST/BPP weekly beginning at 37 weeks  Recommendation for IOL b/w 39.0-39.6           Pregnancy ([facility]) 2023 by SUNSHINE Casanova No    Priority:  Medium       Objective   Physical Exam:   Weight: 96.4 kg (212 lb 8 oz)  TWG: 10.2 kg (22 lb 8 oz)  Expected Total Weight Gain: 5 kg (11 lb)-9 kg (19 lb)   Pregravid BMI: 37.40  BP: 126/80                  Postpartum Depression: Low Risk  (3/28/2024)    [place]  Depression Scale     Last EPDS Total Score: 2     Last EPDS Self Harm Result: Never        Prenatal Labs  Lab Results   Component Value Date    HGB 12.5 2024    HCT 40.2 2024     2024    ABO A 2023    LABRH POS 2023    NEISSGONOAMP Negative 2023    CHLAMTRACAMP Negative 2023    SYPHT Nonreactive 2023    HEPBSAG Nonreactive 2023    HIV1X2 Nonreactive 2023    URINECULTURE No growth 2023     Lab Results   Component Value Date    GLUC1P 90 2024       Assessment/Plan   38 y.o.  at 37w6d  Continue prenatal vitamins and aspirin daily  Reactive NST    Reviewed s/sx of labor, warning signs, fetal movement counts, and when to call provider    Follow up in 1  week(s) for a routine prenatal visit or sooner as needed.    ROME Casanova-JOSHUA

## 2024-06-05 ENCOUNTER — ROUTINE PRENATAL (OUTPATIENT)
Dept: OBSTETRICS AND GYNECOLOGY | Facility: CLINIC | Age: 39
End: 2024-06-05
Payer: COMMERCIAL

## 2024-06-05 VITALS — SYSTOLIC BLOOD PRESSURE: 123 MMHG | DIASTOLIC BLOOD PRESSURE: 85 MMHG | WEIGHT: 210.4 LBS | BODY MASS INDEX: 41.44 KG/M2

## 2024-06-05 DIAGNOSIS — O99.210 OBESITY IN PREGNANCY (HHS-HCC): ICD-10-CM

## 2024-06-05 DIAGNOSIS — O09.511 PRIMIGRAVIDA OF ADVANCED MATERNAL AGE IN FIRST TRIMESTER (HHS-HCC): ICD-10-CM

## 2024-06-05 DIAGNOSIS — Z3A.38 38 WEEKS GESTATION OF PREGNANCY (HHS-HCC): Primary | ICD-10-CM

## 2024-06-05 PROCEDURE — 59025 FETAL NON-STRESS TEST: CPT

## 2024-06-05 PROCEDURE — 0501F PRENATAL FLOW SHEET: CPT

## 2024-06-05 NOTE — PROCEDURES
Teodora Allen, a  at 38w5d with an BENITO of 2024, by Last Menstrual Period, was seen at [facility] for a nonstress test.    Non-Stress Test   Baseline Fetal Heart Rate for Non-Stress Test: 135 BPM  Variability in Waveform for Non-Stress Test: Moderate  Accelerations in Non-Stress Test: greater than/equal to 15 bpm, lasting at least 15 seconds, Yes  Decelerations in Non-Stress Test: None  NST Contraction Frequency: 2-6  Acoustic Stimulator for Non-Stress Test: No  Interpretation of Non-Stress Test   Interpretation of Non-Stress Test: Reactive  Comments on Non-Stress Test: patient not feeling contractions

## 2024-06-05 NOTE — PROGRESS NOTES
Subjective     Teodora Allen is a 38 y.o.  at 38w5d with a working estimated date of delivery of 2024, by Last Menstrual Period who presents for a routine prenatal visit. She denies vaginal bleeding, leakage of fluid, decreased fetal movements, or contractions.    Patient reports overall feeling well    Her pregnancy is complicated by:  Medical Problems       Problem List       Intractable migraine with aura with status migrainosus    Headache, menstrual migraine, intractable    Anxiety    Anaphylaxis    Allergies    Mononeuritis lower limb    Primigravida of advanced maternal age in first trimester ([facility])    Obesity in pregnancy ([facility])    Overview Signed 2024  4:27 PM by SUNSHINE Doan     Growth US at 30 and 36 weeks  NST/BPP weekly beginning at 37 weeks  Recommendation for IOL b/w 39.0-39.6           Pregnancy ([facility])          Objective   Weight: 95.4 kg (210 lb 6.4 oz)  TW.253 kg (20 lb 6.4 oz)  Pregravid BMI: 37.40    BP: 123/85          Prenatal Labs:  Lab Results   Component Value Date    HGB 12.5 2024    HCT 40.2 2024     2024    ABO A 2023    LABRH POS 2023    NEISSGONOAMP Negative 2023    CHLAMTRACAMP Negative 2023    SYPHT Nonreactive 2023    HEPBSAG Nonreactive 2023    HIV1X2 Nonreactive 2023    URINECULTURE No growth 2023       Assessment/Plan   NST today for obesity in pregnancy, reactive  Reviewed s/sx of labor, warning signs, fetal movement counts, and when to call provider  Follow up in 1 week for a routine prenatal visit.    SUNSHINE Doan

## 2024-06-10 ENCOUNTER — ROUTINE PRENATAL (OUTPATIENT)
Dept: OBSTETRICS AND GYNECOLOGY | Facility: CLINIC | Age: 39
End: 2024-06-10
Payer: COMMERCIAL

## 2024-06-10 VITALS — BODY MASS INDEX: 41.65 KG/M2 | DIASTOLIC BLOOD PRESSURE: 66 MMHG | WEIGHT: 211.5 LBS | SYSTOLIC BLOOD PRESSURE: 124 MMHG

## 2024-06-10 DIAGNOSIS — Z3A.39 39 WEEKS GESTATION OF PREGNANCY (HHS-HCC): Primary | ICD-10-CM

## 2024-06-10 DIAGNOSIS — O99.210 OBESITY IN PREGNANCY (HHS-HCC): ICD-10-CM

## 2024-06-10 PROCEDURE — 59025 FETAL NON-STRESS TEST: CPT | Performed by: ADVANCED PRACTICE MIDWIFE

## 2024-06-10 PROCEDURE — 0501F PRENATAL FLOW SHEET: CPT | Performed by: ADVANCED PRACTICE MIDWIFE

## 2024-06-10 NOTE — PROGRESS NOTES
Subjective     Teodora Allen is a 38 y.o.  at 39w3d with a working estimated date of delivery of 2024, by Last Menstrual Period who presents for a routine prenatal visit. Endorses good fetal movement, denies vaginal bleeding, leakage of fluid, or regular contractions.    IOL scheduled this Thursday for obesity.   Reviewed cervical ripening methods    Her pregnancy is complicated by:  Pregnancy Problems (from 23 to present)       Problem Noted Resolved    Primigravida of advanced maternal age in first trimester ([facility]) 2023 by SUNSHINE Casanova No    Priority:  Medium      Obesity in pregnancy ([facility]) 2023 by SUNSHINE Casanova No    Priority:  Medium      Overview Signed 2024  4:27 PM by SUNSHINE Doan     Growth US at 30 and 36 weeks  NST/BPP weekly beginning at 37 weeks  Recommendation for IOL b/w 39.0-39.6           Pregnancy ([facility]) 2023 by SUNSHINE Casanova No    Priority:  Medium       Objective   Physical Exam:   Weight: 95.9 kg (211 lb 8 oz)  TW.752 kg (21 lb 8 oz)  Expected Total Weight Gain: 5 kg (11 lb)-9 kg (19 lb)   Pregravid BMI: 37.40  BP: 124/66  Fetal Heart Rate: 140   Presentation: Vertex (per leopolds)  Dilation: Fingertip (anterior) Effacement (%): 0 Fetal Station: -2    Postpartum Depression: Low Risk  (3/28/2024)    [place]  Depression Scale     Last EPDS Total Score: 2     Last EPDS Self Harm Result: Never        Prenatal Labs  Lab Results   Component Value Date    HGB 12.5 2024    HCT 40.2 2024     2024    ABO A 2023    LABRH POS 2023    NEISSGONOAMP Negative 2023    CHLAMTRACAMP Negative 2023    SYPHT Nonreactive 2023    HEPBSAG Nonreactive 2023    HIV1X2 Nonreactive 2023    URINECULTURE No growth 2023     Lab Results   Component Value Date    GLUC1P 90 2024     Group B Strep Screen   Date Value Ref Range Status    2024 No Group B Streptococcus (GBS) isolated  Final        Assessment/Plan   38 y.o.  at 39w3d  Continue prenatal vitamins  GBS negative    Reviewed s/sx of labor, warning signs, fetal movement counts, and when to call provider    Follow up postpartum or sooner as needed    ROME Casanova-JOSHUA

## 2024-06-10 NOTE — PROCEDURES
Teodora Galvezer, a  at 39w3d with an BENITO of 2024, by Last Menstrual Period, was seen at [facility] for a nonstress test for obesity.    Non-Stress Test   Baseline Fetal Heart Rate for Non-Stress Test: 140 BPM  Variability in Waveform for Non-Stress Test: Moderate  Accelerations in Non-Stress Test: Yes, greater than/equal to 15 bpm  Decelerations in Non-Stress Test: None  Contractions in Non-Stress Test: Irregular  NST Contraction Frequency: 2-6  Acoustic Stimulator for Non-Stress Test: No  Interpretation of Non-Stress Test   Interpretation of Non-Stress Test: Reactive      Sheila Zuluaga, ROME-JOSHUA

## 2024-06-13 ENCOUNTER — ANESTHESIA EVENT (OUTPATIENT)
Dept: OBSTETRICS AND GYNECOLOGY | Facility: HOSPITAL | Age: 39
End: 2024-06-13
Payer: COMMERCIAL

## 2024-06-13 ENCOUNTER — HOSPITAL ENCOUNTER (INPATIENT)
Facility: HOSPITAL | Age: 39
End: 2024-06-13
Attending: OBSTETRICS & GYNECOLOGY
Payer: COMMERCIAL

## 2024-06-13 ENCOUNTER — APPOINTMENT (OUTPATIENT)
Dept: OBSTETRICS AND GYNECOLOGY | Facility: HOSPITAL | Age: 39
End: 2024-06-13
Payer: COMMERCIAL

## 2024-06-13 ENCOUNTER — ANESTHESIA (OUTPATIENT)
Dept: OBSTETRICS AND GYNECOLOGY | Facility: HOSPITAL | Age: 39
End: 2024-06-13
Payer: COMMERCIAL

## 2024-06-13 DIAGNOSIS — O09.513 PRIMIGRAVIDA OF ADVANCED MATERNAL AGE IN THIRD TRIMESTER (HHS-HCC): Primary | ICD-10-CM

## 2024-06-13 DIAGNOSIS — Z3A.39 39 WEEKS GESTATION OF PREGNANCY (HHS-HCC): ICD-10-CM

## 2024-06-13 DIAGNOSIS — G89.18 POSTOPERATIVE PAIN: ICD-10-CM

## 2024-06-13 DIAGNOSIS — R52 POSTPARTUM PAIN (HHS-HCC): ICD-10-CM

## 2024-06-13 DIAGNOSIS — O99.210 OBESITY IN PREGNANCY (HHS-HCC): ICD-10-CM

## 2024-06-13 DIAGNOSIS — K59.00 CONSTIPATION, UNSPECIFIED CONSTIPATION TYPE: ICD-10-CM

## 2024-06-13 PROBLEM — Z34.90 ENCOUNTER FOR INDUCTION OF LABOR (HHS-HCC): Status: ACTIVE | Noted: 2024-06-13

## 2024-06-13 PROBLEM — K21.9 GASTROESOPHAGEAL REFLUX DISEASE WITHOUT ESOPHAGITIS: Status: ACTIVE | Noted: 2024-06-13

## 2024-06-13 LAB
ABO GROUP (TYPE) IN BLOOD: NORMAL
ANTIBODY SCREEN: NORMAL
ERYTHROCYTE [DISTWIDTH] IN BLOOD BY AUTOMATED COUNT: 13.3 % (ref 11.5–14.5)
HCT VFR BLD AUTO: 38.2 % (ref 36–46)
HGB BLD-MCNC: 12.2 G/DL (ref 12–16)
MCH RBC QN AUTO: 28 PG (ref 26–34)
MCHC RBC AUTO-ENTMCNC: 31.9 G/DL (ref 32–36)
MCV RBC AUTO: 88 FL (ref 80–100)
NRBC BLD-RTO: 0 /100 WBCS (ref 0–0)
PLATELET # BLD AUTO: 244 X10*3/UL (ref 150–450)
RBC # BLD AUTO: 4.35 X10*6/UL (ref 4–5.2)
RH FACTOR (ANTIGEN D): NORMAL
WBC # BLD AUTO: 12.6 X10*3/UL (ref 4.4–11.3)

## 2024-06-13 PROCEDURE — 7210000002 HC LABOR PER HOUR

## 2024-06-13 PROCEDURE — 86920 COMPATIBILITY TEST SPIN: CPT

## 2024-06-13 PROCEDURE — 85027 COMPLETE CBC AUTOMATED: CPT

## 2024-06-13 PROCEDURE — 36415 COLL VENOUS BLD VENIPUNCTURE: CPT

## 2024-06-13 PROCEDURE — 86901 BLOOD TYPING SEROLOGIC RH(D): CPT

## 2024-06-13 PROCEDURE — 1120000001 HC OB PRIVATE ROOM DAILY

## 2024-06-13 PROCEDURE — 86780 TREPONEMA PALLIDUM: CPT | Mod: STJLAB

## 2024-06-13 PROCEDURE — 59025 FETAL NON-STRESS TEST: CPT

## 2024-06-13 PROCEDURE — 2500000001 HC RX 250 WO HCPCS SELF ADMINISTERED DRUGS (ALT 637 FOR MEDICARE OP)

## 2024-06-13 RX ORDER — OXYTOCIN 10 [USP'U]/ML
10 INJECTION, SOLUTION INTRAMUSCULAR; INTRAVENOUS ONCE AS NEEDED
Status: DISCONTINUED | OUTPATIENT
Start: 2024-06-13 | End: 2024-06-14

## 2024-06-13 RX ORDER — NIFEDIPINE 10 MG/1
10 CAPSULE ORAL ONCE AS NEEDED
Status: DISCONTINUED | OUTPATIENT
Start: 2024-06-13 | End: 2024-06-14

## 2024-06-13 RX ORDER — LIDOCAINE HYDROCHLORIDE 10 MG/ML
30 INJECTION INFILTRATION; PERINEURAL ONCE AS NEEDED
Status: DISCONTINUED | OUTPATIENT
Start: 2024-06-13 | End: 2024-06-14

## 2024-06-13 RX ORDER — CARBOPROST TROMETHAMINE 250 UG/ML
250 INJECTION, SOLUTION INTRAMUSCULAR ONCE AS NEEDED
Status: DISCONTINUED | OUTPATIENT
Start: 2024-06-13 | End: 2024-06-14

## 2024-06-13 RX ORDER — MISOPROSTOL 200 UG/1
800 TABLET ORAL ONCE AS NEEDED
Status: DISCONTINUED | OUTPATIENT
Start: 2024-06-13 | End: 2024-06-14

## 2024-06-13 RX ORDER — TERBUTALINE SULFATE 1 MG/ML
0.25 INJECTION SUBCUTANEOUS ONCE AS NEEDED
Status: DISCONTINUED | OUTPATIENT
Start: 2024-06-13 | End: 2024-06-14

## 2024-06-13 RX ORDER — ONDANSETRON HYDROCHLORIDE 2 MG/ML
4 INJECTION, SOLUTION INTRAVENOUS EVERY 6 HOURS PRN
Status: DISCONTINUED | OUTPATIENT
Start: 2024-06-13 | End: 2024-06-14

## 2024-06-13 RX ORDER — ONDANSETRON 4 MG/1
4 TABLET, FILM COATED ORAL EVERY 6 HOURS PRN
Status: DISCONTINUED | OUTPATIENT
Start: 2024-06-13 | End: 2024-06-14

## 2024-06-13 RX ORDER — SODIUM CHLORIDE, SODIUM LACTATE, POTASSIUM CHLORIDE, CALCIUM CHLORIDE 600; 310; 30; 20 MG/100ML; MG/100ML; MG/100ML; MG/100ML
125 INJECTION, SOLUTION INTRAVENOUS CONTINUOUS
Status: DISCONTINUED | OUTPATIENT
Start: 2024-06-13 | End: 2024-06-14

## 2024-06-13 RX ORDER — METOCLOPRAMIDE 10 MG/1
10 TABLET ORAL EVERY 6 HOURS PRN
Status: DISCONTINUED | OUTPATIENT
Start: 2024-06-13 | End: 2024-06-14

## 2024-06-13 RX ORDER — TRANEXAMIC ACID 100 MG/ML
1000 INJECTION, SOLUTION INTRAVENOUS ONCE AS NEEDED
Status: DISCONTINUED | OUTPATIENT
Start: 2024-06-13 | End: 2024-06-14

## 2024-06-13 RX ORDER — OXYTOCIN/0.9 % SODIUM CHLORIDE 30/500 ML
60 PLASTIC BAG, INJECTION (ML) INTRAVENOUS ONCE AS NEEDED
Status: DISCONTINUED | OUTPATIENT
Start: 2024-06-13 | End: 2024-06-14

## 2024-06-13 RX ORDER — METHYLERGONOVINE MALEATE 0.2 MG/ML
0.2 INJECTION INTRAVENOUS ONCE AS NEEDED
Status: DISCONTINUED | OUTPATIENT
Start: 2024-06-13 | End: 2024-06-14

## 2024-06-13 RX ORDER — LABETALOL HYDROCHLORIDE 5 MG/ML
20 INJECTION, SOLUTION INTRAVENOUS ONCE AS NEEDED
Status: DISCONTINUED | OUTPATIENT
Start: 2024-06-13 | End: 2024-06-14

## 2024-06-13 RX ORDER — METOCLOPRAMIDE HYDROCHLORIDE 5 MG/ML
10 INJECTION INTRAMUSCULAR; INTRAVENOUS EVERY 6 HOURS PRN
Status: DISCONTINUED | OUTPATIENT
Start: 2024-06-13 | End: 2024-06-14

## 2024-06-13 RX ORDER — LOPERAMIDE HYDROCHLORIDE 2 MG/1
4 CAPSULE ORAL EVERY 2 HOUR PRN
Status: DISCONTINUED | OUTPATIENT
Start: 2024-06-13 | End: 2024-06-14

## 2024-06-13 RX ORDER — HYDRALAZINE HYDROCHLORIDE 20 MG/ML
5 INJECTION INTRAMUSCULAR; INTRAVENOUS ONCE AS NEEDED
Status: DISCONTINUED | OUTPATIENT
Start: 2024-06-13 | End: 2024-06-14

## 2024-06-13 SDOH — HEALTH STABILITY: MENTAL HEALTH: WISH TO BE DEAD (PAST 1 MONTH): NO

## 2024-06-13 SDOH — SOCIAL STABILITY: SOCIAL INSECURITY: HAVE YOU HAD THOUGHTS OF HARMING ANYONE ELSE?: NO

## 2024-06-13 SDOH — HEALTH STABILITY: MENTAL HEALTH: NON-SPECIFIC ACTIVE SUICIDAL THOUGHTS (PAST 1 MONTH): NO

## 2024-06-13 SDOH — HEALTH STABILITY: MENTAL HEALTH: HAVE YOU USED ANY SUBSTANCES (CANABIS, COCAINE, HEROIN, HALLUCINOGENS, INHALANTS, ETC.) IN THE PAST 12 MONTHS?: NO

## 2024-06-13 SDOH — SOCIAL STABILITY: SOCIAL INSECURITY: HAS ANYONE EVER THREATENED TO HURT YOUR FAMILY OR YOUR PETS?: NO

## 2024-06-13 SDOH — HEALTH STABILITY: MENTAL HEALTH: WERE YOU ABLE TO COMPLETE ALL THE BEHAVIORAL HEALTH SCREENINGS?: YES

## 2024-06-13 SDOH — HEALTH STABILITY: MENTAL HEALTH: SUICIDAL BEHAVIOR (LIFETIME): NO

## 2024-06-13 SDOH — SOCIAL STABILITY: SOCIAL INSECURITY: DO YOU FEEL ANYONE HAS EXPLOITED OR TAKEN ADVANTAGE OF YOU FINANCIALLY OR OF YOUR PERSONAL PROPERTY?: NO

## 2024-06-13 SDOH — SOCIAL STABILITY: SOCIAL INSECURITY: PHYSICAL ABUSE: DENIES

## 2024-06-13 SDOH — HEALTH STABILITY: MENTAL HEALTH: HAVE YOU USED ANY PRESCRIPTION DRUGS OTHER THAN PRESCRIBED IN THE PAST 12 MONTHS?: NO

## 2024-06-13 SDOH — SOCIAL STABILITY: SOCIAL INSECURITY: ARE THERE ANY APPARENT SIGNS OF INJURIES/BEHAVIORS THAT COULD BE RELATED TO ABUSE/NEGLECT?: NO

## 2024-06-13 SDOH — SOCIAL STABILITY: SOCIAL INSECURITY: ARE YOU OR HAVE YOU BEEN THREATENED OR ABUSED PHYSICALLY, EMOTIONALLY, OR SEXUALLY BY ANYONE?: NO

## 2024-06-13 SDOH — SOCIAL STABILITY: SOCIAL INSECURITY: HAVE YOU HAD ANY THOUGHTS OF HARMING ANYONE ELSE?: NO

## 2024-06-13 SDOH — SOCIAL STABILITY: SOCIAL INSECURITY: VERBAL ABUSE: DENIES

## 2024-06-13 SDOH — HEALTH STABILITY: MENTAL HEALTH: STRENGTHS (MUST CHOOSE TWO): STABLE HOUSING;SUPPORT FROM FAMILY

## 2024-06-13 SDOH — SOCIAL STABILITY: SOCIAL INSECURITY: ABUSE SCREEN: ADULT

## 2024-06-13 SDOH — ECONOMIC STABILITY: HOUSING INSECURITY: DO YOU FEEL UNSAFE GOING BACK TO THE PLACE WHERE YOU ARE LIVING?: NO

## 2024-06-13 SDOH — SOCIAL STABILITY: SOCIAL INSECURITY: DOES ANYONE TRY TO KEEP YOU FROM HAVING/CONTACTING OTHER FRIENDS OR DOING THINGS OUTSIDE YOUR HOME?: NO

## 2024-06-13 ASSESSMENT — LIFESTYLE VARIABLES
AUDIT-C TOTAL SCORE: 0
HOW OFTEN DO YOU HAVE A DRINK CONTAINING ALCOHOL: NEVER
SKIP TO QUESTIONS 9-10: 1
AUDIT-C TOTAL SCORE: 0
HOW MANY STANDARD DRINKS CONTAINING ALCOHOL DO YOU HAVE ON A TYPICAL DAY: PATIENT DOES NOT DRINK
HOW OFTEN DO YOU HAVE 6 OR MORE DRINKS ON ONE OCCASION: NEVER

## 2024-06-13 ASSESSMENT — ACTIVITIES OF DAILY LIVING (ADL)
WALKS IN HOME: INDEPENDENT
FEEDING YOURSELF: INDEPENDENT
BATHING: INDEPENDENT
DRESSING YOURSELF: INDEPENDENT
TOILETING: INDEPENDENT
HEARING - RIGHT EAR: FUNCTIONAL
JUDGMENT_ADEQUATE_SAFELY_COMPLETE_DAILY_ACTIVITIES: YES
GROOMING: INDEPENDENT
ADEQUATE_TO_COMPLETE_ADL: YES
PATIENT'S MEMORY ADEQUATE TO SAFELY COMPLETE DAILY ACTIVITIES?: YES
LACK_OF_TRANSPORTATION: NO
HEARING - LEFT EAR: FUNCTIONAL

## 2024-06-13 ASSESSMENT — PATIENT HEALTH QUESTIONNAIRE - PHQ9
1. LITTLE INTEREST OR PLEASURE IN DOING THINGS: NOT AT ALL
SUM OF ALL RESPONSES TO PHQ9 QUESTIONS 1 & 2: 0
2. FEELING DOWN, DEPRESSED OR HOPELESS: NOT AT ALL

## 2024-06-13 ASSESSMENT — PAIN SCALES - GENERAL: PAINLEVEL_OUTOF10: 1

## 2024-06-14 ENCOUNTER — ANESTHESIA EVENT (OUTPATIENT)
Dept: OBSTETRICS AND GYNECOLOGY | Facility: HOSPITAL | Age: 39
End: 2024-06-14
Payer: COMMERCIAL

## 2024-06-14 ENCOUNTER — ANESTHESIA (OUTPATIENT)
Dept: OBSTETRICS AND GYNECOLOGY | Facility: HOSPITAL | Age: 39
End: 2024-06-14
Payer: COMMERCIAL

## 2024-06-14 PROBLEM — Z34.90 ENCOUNTER FOR INDUCTION OF LABOR (HHS-HCC): Status: RESOLVED | Noted: 2024-06-13 | Resolved: 2024-06-14

## 2024-06-14 PROBLEM — Z98.891 STATUS POST PRIMARY LOW TRANSVERSE CESAREAN SECTION: Status: ACTIVE | Noted: 2024-06-14

## 2024-06-14 PROBLEM — O09.513 PRIMIGRAVIDA OF ADVANCED MATERNAL AGE IN THIRD TRIMESTER (HHS-HCC): Status: ACTIVE | Noted: 2023-11-06

## 2024-06-14 LAB
ABO GROUP (TYPE) IN BLOOD: NORMAL
ALBUMIN SERPL BCP-MCNC: 3.2 G/DL (ref 3.4–5)
ALP SERPL-CCNC: 147 U/L (ref 33–110)
ALT SERPL W P-5'-P-CCNC: 17 U/L (ref 7–45)
ANION GAP SERPL CALC-SCNC: 11 MMOL/L (ref 10–20)
AST SERPL W P-5'-P-CCNC: 15 U/L (ref 9–39)
BASE EXCESS BLDCOA CALC-SCNC: -1.1 MMOL/L (ref -10.8–-0.5)
BASE EXCESS BLDCOV CALC-SCNC: -1.5 MMOL/L (ref -8.1–-0.5)
BILIRUB SERPL-MCNC: 0.3 MG/DL (ref 0–1.2)
BODY TEMPERATURE: ABNORMAL
BODY TEMPERATURE: ABNORMAL
BUN SERPL-MCNC: 8 MG/DL (ref 6–23)
CALCIUM SERPL-MCNC: 8.9 MG/DL (ref 8.6–10.3)
CHLORIDE SERPL-SCNC: 103 MMOL/L (ref 98–107)
CO2 SERPL-SCNC: 22 MMOL/L (ref 21–32)
CREAT SERPL-MCNC: 0.56 MG/DL (ref 0.5–1.05)
CREAT UR-MCNC: 42.8 MG/DL (ref 20–320)
EGFRCR SERPLBLD CKD-EPI 2021: >90 ML/MIN/1.73M*2
ERYTHROCYTE [DISTWIDTH] IN BLOOD BY AUTOMATED COUNT: 13.4 % (ref 11.5–14.5)
GLUCOSE SERPL-MCNC: 102 MG/DL (ref 74–99)
HCO3 BLDCOA-SCNC: 24.8 MMOL/L (ref 15–29)
HCO3 BLDCOV-SCNC: 24.3 MMOL/L (ref 16–26)
HCT VFR BLD AUTO: 38.5 % (ref 36–46)
HGB BLD-MCNC: 12.5 G/DL (ref 12–16)
INHALED O2 CONCENTRATION: 21 %
INHALED O2 CONCENTRATION: 21 %
LDH SERPL L TO P-CCNC: 207 U/L (ref 84–246)
MCH RBC QN AUTO: 28.3 PG (ref 26–34)
MCHC RBC AUTO-ENTMCNC: 32.5 G/DL (ref 32–36)
MCV RBC AUTO: 87 FL (ref 80–100)
NRBC BLD-RTO: 0 /100 WBCS (ref 0–0)
OXYHGB MFR BLDCOA: 45.9 % (ref 94–98)
OXYHGB MFR BLDCOV: 48.5 % (ref 94–98)
PCO2 BLDCOA: 45 MM HG (ref 31–75)
PCO2 BLDCOV: 44 MM HG (ref 22–53)
PH BLDCOA: 7.35 PH (ref 7.08–7.39)
PH BLDCOV: 7.35 PH (ref 7.19–7.47)
PLATELET # BLD AUTO: 218 X10*3/UL (ref 150–450)
PO2 BLDCOA: 25 MM HG (ref 5–31)
PO2 BLDCOV: 26 MM HG (ref 13–37)
POTASSIUM SERPL-SCNC: 3.4 MMOL/L (ref 3.5–5.3)
PROT SERPL-MCNC: 5.8 G/DL (ref 6.4–8.2)
PROT UR-ACNC: 6 MG/DL (ref 5–24)
PROT/CREAT UR: 0.14 MG/MG CREAT (ref 0–0.17)
RBC # BLD AUTO: 4.41 X10*6/UL (ref 4–5.2)
RH FACTOR (ANTIGEN D): NORMAL
SAO2 % BLDCOA: 47 % (ref 3–69)
SAO2 % BLDCOV: 49 % (ref 16–84)
SODIUM SERPL-SCNC: 133 MMOL/L (ref 136–145)
TEST COMMENT: ABNORMAL
TEST COMMENT: ABNORMAL
TREPONEMA PALLIDUM IGG+IGM AB [PRESENCE] IN SERUM OR PLASMA BY IMMUNOASSAY: NONREACTIVE
URATE SERPL-MCNC: 5 MG/DL (ref 2.3–6.7)
WBC # BLD AUTO: 14.2 X10*3/UL (ref 4.4–11.3)

## 2024-06-14 PROCEDURE — 2500000005 HC RX 250 GENERAL PHARMACY W/O HCPCS: Performed by: NURSE ANESTHETIST, CERTIFIED REGISTERED

## 2024-06-14 PROCEDURE — 36600 WITHDRAWAL OF ARTERIAL BLOOD: CPT

## 2024-06-14 PROCEDURE — 2500000004 HC RX 250 GENERAL PHARMACY W/ HCPCS (ALT 636 FOR OP/ED)

## 2024-06-14 PROCEDURE — 59050 FETAL MONITOR W/REPORT: CPT

## 2024-06-14 PROCEDURE — 2500000004 HC RX 250 GENERAL PHARMACY W/ HCPCS (ALT 636 FOR OP/ED): Mod: JZ | Performed by: STUDENT IN AN ORGANIZED HEALTH CARE EDUCATION/TRAINING PROGRAM

## 2024-06-14 PROCEDURE — 83615 LACTATE (LD) (LDH) ENZYME: CPT | Performed by: ADVANCED PRACTICE MIDWIFE

## 2024-06-14 PROCEDURE — 7210000002 HC LABOR PER HOUR

## 2024-06-14 PROCEDURE — 36415 COLL VENOUS BLD VENIPUNCTURE: CPT | Performed by: ADVANCED PRACTICE MIDWIFE

## 2024-06-14 PROCEDURE — 2500000004 HC RX 250 GENERAL PHARMACY W/ HCPCS (ALT 636 FOR OP/ED): Performed by: STUDENT IN AN ORGANIZED HEALTH CARE EDUCATION/TRAINING PROGRAM

## 2024-06-14 PROCEDURE — 82805 BLOOD GASES W/O2 SATURATION: CPT

## 2024-06-14 PROCEDURE — 36415 COLL VENOUS BLD VENIPUNCTURE: CPT

## 2024-06-14 PROCEDURE — 2500000005 HC RX 250 GENERAL PHARMACY W/O HCPCS: Performed by: STUDENT IN AN ORGANIZED HEALTH CARE EDUCATION/TRAINING PROGRAM

## 2024-06-14 PROCEDURE — 51702 INSERT TEMP BLADDER CATH: CPT

## 2024-06-14 PROCEDURE — 7100000016 HC LABOR RECOVERY PER HOUR: Performed by: STUDENT IN AN ORGANIZED HEALTH CARE EDUCATION/TRAINING PROGRAM

## 2024-06-14 PROCEDURE — 2500000001 HC RX 250 WO HCPCS SELF ADMINISTERED DRUGS (ALT 637 FOR MEDICARE OP)

## 2024-06-14 PROCEDURE — 3700000014 HC AN EPIDURAL BLOCK CHARGE: Performed by: STUDENT IN AN ORGANIZED HEALTH CARE EDUCATION/TRAINING PROGRAM

## 2024-06-14 PROCEDURE — 59510 CESAREAN DELIVERY: CPT | Performed by: STUDENT IN AN ORGANIZED HEALTH CARE EDUCATION/TRAINING PROGRAM

## 2024-06-14 PROCEDURE — 85027 COMPLETE CBC AUTOMATED: CPT | Performed by: ADVANCED PRACTICE MIDWIFE

## 2024-06-14 PROCEDURE — 59514 CESAREAN DELIVERY ONLY: CPT | Performed by: STUDENT IN AN ORGANIZED HEALTH CARE EDUCATION/TRAINING PROGRAM

## 2024-06-14 PROCEDURE — 2500000001 HC RX 250 WO HCPCS SELF ADMINISTERED DRUGS (ALT 637 FOR MEDICARE OP): Performed by: NURSE ANESTHETIST, CERTIFIED REGISTERED

## 2024-06-14 PROCEDURE — 2500000004 HC RX 250 GENERAL PHARMACY W/ HCPCS (ALT 636 FOR OP/ED): Performed by: NURSE ANESTHETIST, CERTIFIED REGISTERED

## 2024-06-14 PROCEDURE — 84075 ASSAY ALKALINE PHOSPHATASE: CPT | Performed by: ADVANCED PRACTICE MIDWIFE

## 2024-06-14 PROCEDURE — 1120000001 HC OB PRIVATE ROOM DAILY

## 2024-06-14 PROCEDURE — 2500000004 HC RX 250 GENERAL PHARMACY W/ HCPCS (ALT 636 FOR OP/ED): Performed by: ADVANCED PRACTICE MIDWIFE

## 2024-06-14 PROCEDURE — 82570 ASSAY OF URINE CREATININE: CPT | Performed by: ADVANCED PRACTICE MIDWIFE

## 2024-06-14 PROCEDURE — 2500000005 HC RX 250 GENERAL PHARMACY W/O HCPCS

## 2024-06-14 PROCEDURE — 84550 ASSAY OF BLOOD/URIC ACID: CPT | Performed by: ADVANCED PRACTICE MIDWIFE

## 2024-06-14 RX ORDER — CEFAZOLIN SODIUM 2 G/100ML
2 INJECTION, SOLUTION INTRAVENOUS ONCE
Status: COMPLETED | OUTPATIENT
Start: 2024-06-14 | End: 2024-06-14

## 2024-06-14 RX ORDER — FENTANYL CITRATE 50 UG/ML
INJECTION, SOLUTION INTRAMUSCULAR; INTRAVENOUS AS NEEDED
Status: DISCONTINUED | OUTPATIENT
Start: 2024-06-14 | End: 2024-06-14

## 2024-06-14 RX ORDER — SCOLOPAMINE TRANSDERMAL SYSTEM 1 MG/1
PATCH, EXTENDED RELEASE TRANSDERMAL AS NEEDED
Status: DISCONTINUED | OUTPATIENT
Start: 2024-06-14 | End: 2024-06-14

## 2024-06-14 RX ORDER — OXYTOCIN/0.9 % SODIUM CHLORIDE 30/500 ML
2-30 PLASTIC BAG, INJECTION (ML) INTRAVENOUS CONTINUOUS
Status: DISCONTINUED | OUTPATIENT
Start: 2024-06-14 | End: 2024-06-14

## 2024-06-14 RX ORDER — KETOROLAC TROMETHAMINE 30 MG/ML
INJECTION, SOLUTION INTRAMUSCULAR; INTRAVENOUS AS NEEDED
Status: DISCONTINUED | OUTPATIENT
Start: 2024-06-14 | End: 2024-06-14

## 2024-06-14 RX ORDER — ACETAMINOPHEN 120 MG/1
SUPPOSITORY RECTAL AS NEEDED
Status: DISCONTINUED | OUTPATIENT
Start: 2024-06-14 | End: 2024-06-14

## 2024-06-14 RX ORDER — FENTANYL/ROPIVACAINE/NS/PF 2MCG/ML-.2
PLASTIC BAG, INJECTION (ML) INJECTION
Status: COMPLETED
Start: 2024-06-14 | End: 2024-06-14

## 2024-06-14 RX ORDER — NALOXONE HYDROCHLORIDE 0.4 MG/ML
0.2 INJECTION, SOLUTION INTRAMUSCULAR; INTRAVENOUS; SUBCUTANEOUS AS NEEDED
Status: DISCONTINUED | OUTPATIENT
Start: 2024-06-14 | End: 2024-06-17 | Stop reason: HOSPADM

## 2024-06-14 RX ORDER — LIDOCAINE HYDROCHLORIDE 20 MG/ML
INJECTION, SOLUTION EPIDURAL; INFILTRATION; INTRACAUDAL; PERINEURAL AS NEEDED
Status: DISCONTINUED | OUTPATIENT
Start: 2024-06-14 | End: 2024-06-14

## 2024-06-14 RX ORDER — HYDROMORPHONE HYDROCHLORIDE 1 MG/ML
0.2 INJECTION, SOLUTION INTRAMUSCULAR; INTRAVENOUS; SUBCUTANEOUS EVERY 5 MIN PRN
Status: DISCONTINUED | OUTPATIENT
Start: 2024-06-14 | End: 2024-06-15 | Stop reason: HOSPADM

## 2024-06-14 RX ORDER — HYDROMORPHONE HCL/0.9% NACL/PF 15 MG/30ML
PATIENT CONTROLLED ANALGESIA SYRINGE INTRAVENOUS CONTINUOUS
Status: DISCONTINUED | OUTPATIENT
Start: 2024-06-14 | End: 2024-06-17 | Stop reason: HOSPADM

## 2024-06-14 RX ORDER — FENTANYL/ROPIVACAINE/NS/PF 2MCG/ML-.2
0-25 PLASTIC BAG, INJECTION (ML) INJECTION CONTINUOUS
Status: DISCONTINUED | OUTPATIENT
Start: 2024-06-14 | End: 2024-06-14

## 2024-06-14 ASSESSMENT — PAIN SCALES - GENERAL
PAINLEVEL_OUTOF10: 0 - NO PAIN
PAINLEVEL_OUTOF10: 1
PAINLEVEL_OUTOF10: 0 - NO PAIN
PAINLEVEL_OUTOF10: 1
PAINLEVEL_OUTOF10: 0 - NO PAIN
PAINLEVEL_OUTOF10: 2
PAINLEVEL_OUTOF10: 3
PAINLEVEL_OUTOF10: 0 - NO PAIN
PAINLEVEL_OUTOF10: 2

## 2024-06-14 NOTE — PROGRESS NOTES
"Subjective:  Teodora Allen is resting comfortably in bed;  is supportive at bedside.  Rating pain with contractions as a 1-2 out of 10.    Objective:  Fetal Monitoring      Baseline FHR: 130 per minute  Variability: moderate  Accelerations: yes  Decelerations:  Late decelerations approximately one hour ago resolved by maternal repositioning.  TOCO: irregular, every 1.5-5 minutes    Cervical Exam:  Deferred.      Vitals:    24 2034 24 0009 24 0010 24 0020   BP:   111/70    Pulse: 104 77 78    Resp:   16    Temp:   36.7 °C (98 °F)    TempSrc:   Oral    SpO2: 96% (!) 93% (!) 95%    Weight:    96.2 kg (211 lb 15.6 oz)   Height:    1.499 m (4' 11\")        Assessment:  38 y.o.  at 40w0d by LMP,  IOL, Latent Labor,  Cat. I FHT,  GBS neg.     Plan:  CEFM,  Pain management as desired,  25 mcg. Cytotec (dose #2) placed in the posterior fornix for cervical ripening @ 0302.   Consider CRB with next cervical exam,  Encourage frequent position changes,  Anticipate NSVB.    Dr. Oneill aware of and agrees with above POC.      Nighat Rowell, APRN-NENAM    "

## 2024-06-14 NOTE — CARE PLAN
The patient's goals for the shift include dilate more!!    The clinical goals for the shift include FHR remains reassuring during labor      Problem: Vaginal Birth or  Section  Goal: Fetal and maternal status remain reassuring during the birth process  2024 by Korinne Kathryn Becks, RN  Outcome: Progressing  Flowsheets (Taken 2024)  Fetal and maternal status remain reassuring during the birth process:   Monitor vital signs   Monitor fetal heart rate   Monitor uterine activity   Monitor labor progression (Vaginal delivery)   Med administration/monitoring of effect   Deep vein thrombosis prophylaxis  2024 by Korinne Kathryn Becks, RN  Outcome: Progressing  Goal: Prevention of malpresentation/labor dystocia through delivery  2024 by Korinne Kathryn Becks, RN  Outcome: Progressing  Flowsheets (Taken 2024)  Prevention of malpresentation/labor dystocia through delivery: Positioning, turning, and/or use of birthing ball assistance  2024 by Korinne Kathryn Becks, RN  Outcome: Progressing  Goal: Demonstrates labor coping techniques through delivery  2024 by Korinne Kathryn Becks, RN  Outcome: Progressing  Flowsheets (Taken 2024)  Demonstrates labor coping techniques through delivery:   Positioning, turning, and/or use of birthing ball assistance   Breathing/relaxation assistance  2024 by Korinne Kathryn Becks, RN  Outcome: Progressing  Goal: Minimal s/sx of HDP and BP<160/110  2024 by Korinne Kathryn Becks, RN  Outcome: Progressing  Flowsheets (Taken 2024)  Minimal s/sx of HDP and BP <160/110:   Med administration/monitoring of effect   Monitor QBL and vital signs  2024 by Korinne Kathryn Becks, RN  Outcome: Progressing  Goal: No s/sx of infection through recovery  2024 by Korinne Kathryn Becks, RN  Outcome: Progressing  Flowsheets (Taken 2024)  No s/sx of infection through  recovery:   Hygiene practices/turner-care   Monitor QBL and vital signs  6/14/2024 0714 by Korinne Kathryn Becks, RN  Outcome: Progressing  Goal: No s/sx of hemorrhage through recovery  6/14/2024 0718 by Korinne Kathryn Becks, RN  Outcome: Progressing  Flowsheets (Taken 6/14/2024 0718)  No s/sx of hemorrhage through recovery: Monitor QBL and vital signs  6/14/2024 0714 by Korinne Kathryn Becks, RN  Outcome: Progressing     Problem: Pain - Adult  Goal: Verbalizes/displays adequate comfort level or baseline comfort level  6/14/2024 0718 by Korinne Kathryn Becks, RN  Outcome: Progressing  Flowsheets (Taken 6/14/2024 0718)  Verbalizes/displays adequate comfort level or baseline comfort level:   Encourage patient to monitor pain and request assistance   Assess pain using appropriate pain scale   Administer analgesics based on type and severity of pain and evaluate response   Implement non-pharmacological measures as appropriate and evaluate response   Notify Licensed Independent Practitioner if interventions unsuccessful or patient reports new pain   Consider cultural and social influences on pain and pain management  6/14/2024 0714 by Korinne Kathryn Becks, RN  Outcome: Progressing     Problem: Safety - Adult  Goal: Free from fall injury  6/14/2024 0718 by Korinne Kathryn Becks, RN  Outcome: Progressing  Flowsheets (Taken 6/14/2024 0718)  Free from fall injury:   Instruct family/caregiver on patient safety   Based on caregiver fall risk screen, instruct family/caregiver to ask for assistance with transferring infant if caregiver noted to have fall risk factors  6/14/2024 0714 by Korinne Kathryn Becks, RN  Outcome: Progressing     Problem: Discharge Planning  Goal: Discharge to home or other facility with appropriate resources  6/14/2024 0718 by Korinne Kathryn Becks, RN  Outcome: Progressing  Flowsheets (Taken 6/14/2024 0718)  Discharge to home or other facility with appropriate resources:   Identify barriers to discharge  with patient and caregiver   Arrange for needed discharge resources and transportation as appropriate   Arrange for interpreters to assist at discharge as needed   Identify discharge learning needs (meds, wound care, etc)   Refer to discharge planning if patient needs post-hospital services based on physician order or complex needs related to functional status, cognitive ability or social support system  6/14/2024 0714 by Korinne Kathryn Becks, RN  Outcome: Progressing     Problem: Chronic Conditions and Co-morbidities  Goal: Patient's chronic conditions and co-morbidity symptoms are monitored and maintained or improved  6/14/2024 0718 by Korinne Kathryn Becks, RN  Outcome: Progressing  Flowsheets (Taken 6/14/2024 0718)  Care Plan - Patient's Chronic Conditions and Co-Morbidity Symptoms are Monitored and Maintained or Improved:   Monitor and assess patient's chronic conditions and comorbid symptoms for stability, deterioration, or improvement   Collaborate with multidisciplinary team to address chronic and comorbid conditions and prevent exacerbation or deterioration   Update acute care plan with appropriate goals if chronic or comorbid symptoms are exacerbated and prevent overall improvement and discharge  6/14/2024 0714 by Korinne Kathryn Becks, RN  Outcome: Progressing

## 2024-06-14 NOTE — CARE PLAN
The patient's goals for the shift include Get some rest    The clinical goals for the shift include FHR remains reassuring    Over the shift, the patient did make progress toward the following goals.

## 2024-06-14 NOTE — PROGRESS NOTES
Late entry for 0900:    S: Teodora is doing well, feeling some cramping but relatively comfortable. Discussed plans for 4th Cytotec at 0930 and possibly placing CRB.     O: Visit Vitals  /72   Pulse 84   Temp 36.7 °C (98.1 °F) (Oral)   Resp 16      FHR: 140 BPM, moderate variability, +accels, occasional variable decels  TOCO: contractions 2-5 mins  VE: deferred  Membranes: Intact    A: 38 y.o.  40w0d  Latent labor  Cat 2 tracing  GBS negative    P: Continue plan for cervical ripening with CRB and Cytotec  cEFM  Diet: reg  Frequent position changes  Dr. Albert updated  Anticipate

## 2024-06-14 NOTE — HOSPITAL COURSE
38 y.o  @ 39w6d by LMP,  IOL, BMI >35,  Cat. 1 FHT,  GBS neg.    Admit -- 0.5/50/-3  Cyto #1 @ .  Cyto #2 @ 0302.  Cyto #3 @ 5673; SVE unchanged.   1st mild range 145/97, labs ordered WNLs  PCR 0.14  CRB @ 1220  SROM 1635

## 2024-06-14 NOTE — H&P
Obstetrical Admission History and Physical     Teodora Allen is a 38 y.o.  at 39w6d. BENITO: 2024, by Last Menstrual Period. Estimated fetal weight: 7 lb., 10 oz. She has had prenatal care with AMBER Zuluaga CNM .    Chief Complaint: Scheduled Induction    Assessment/Plan    A:   38 y.o  @ 39w6d by LMP,  IOL, BMI >35,  Cat. 1 FHT,  GBS neg.    P:  Admit to L&D,   Routine labs, plus type and cross,  CEFM,  Pain management as desired -- Hopeful for NCB, though open to epidural,  Encourage frequent position changes,  25 mcg. Cytotec placed in the posterior fornix for cervical ripening @ 2220.   Pt. tolerated well, will continue to monitor progress.   Anticipate .    Patient and her  report understanding of POC; all questions answered at this time.    Dr. Oneill aware of patient status and agrees with above POC.    SUNSHINE Busby      Principal Problem:    Encounter for induction of labor ([facility])  Active Problems:    Gastroesophageal reflux disease without esophagitis      Pregnancy Problems (from 25 to present)       Problem Noted Resolved    Encounter for induction of labor ([facility]) 2024 by SUNSHINE Busby No    Priority:  Medium      Primigravida of advanced maternal age in first trimester ([facility]) 2023 by SUSNHINE Casanova No    Priority:  Medium      Obesity in pregnancy ([facility]) 2023 by SUNSHINE Casanova No    Priority:  Medium      Overview Signed 2024  4:27 PM by SUNSHINE Doan     Growth US at 30 and 36 weeks  NST/BPP weekly beginning at 37 weeks  Recommendation for IOL b/w 39.0-39.6           Pregnancy ([facility]) 2023 by SUNSHINE Casanova No    Priority:  Medium            Options for delivery have been discussed with the patient and she elects for an induction of labor.  Cervical ripening with cytotec, cervidil, other prostaglandin agents has been discussed.  Induction of labor with  pitocin, amniotomy, cytotec, and cervical balloon have been discussed in detail. The risks, benefits, complications, alternatives, expected outcomes, potential problems during recuperation and recovery, and the risks of not performing the procedure were discussed with the patient. The patient stated understanding that the risks of delivery include, but are not limited to: death; reaction to medications; injury to bowel, bladder, ureters, uterus, cervix, vagina, and other pelvic and abdominal structures, infection; blood loss and possible need for transfusion; and potential need for surgery, including hysterectomy. The risks of injury to the infant during delivery were also discussed. All questions were answered. There was concurrence with the planned procedure, and the patient wanted to proceed.    Admit to inpatient status. I anticipate that this patient will require a stay exceeding at least 2 midnights for delivery and postpartum.  Induction of labor.  Management of pregnancy complications, as indicated.    Subjective   Good fetal movement. Denies vaginal bleeding., C/O of occasional contractions., Denies leaking of fluid.       Reason for Induction of Labor:  Obesity, BMI >35.       Obstetrical History   OB History    Para Term  AB Living   1 0 0 0 0 0   SAB IAB Ectopic Multiple Live Births   0 0 0 0 0      # Outcome Date GA Lbr David/2nd Weight Sex Delivery Anes PTL Lv   1 Current                Past Medical History  Past Medical History:   Diagnosis Date    Acid reflux     Endometriosis     IBS (irritable bowel syndrome)     Migraines     Multiple developmental ovarian cysts     Urgency of urination 2006        Past Surgical History   Past Surgical History:   Procedure Laterality Date    APPENDECTOMY      CHOLECYSTECTOMY      TONSILLECTOMY         Social History  Social History     Tobacco Use    Smoking status: Never    Smokeless tobacco: Never   Substance Use Topics    Alcohol use: Not  Currently     Comment: social     Substance and Sexual Activity   Drug Use Never       Allergies  Compazine [prochlorperazine] and Morphine     Medications  Medications Prior to Admission   Medication Sig Dispense Refill Last Dose    aspirin 81 mg EC tablet Take 2 tablets (162 mg) by mouth once daily. 60 tablet 11 6/13/2024 at 1400    BLACK COHOSH ORAL Take by mouth.   6/13/2024 at 1400    omeprazole (PriLOSEC) 20 mg DR capsule Take 1 capsule (20 mg) by mouth once daily. Do not crush or chew. 90 capsule 3 6/13/2024 at 1400    prenatal vit no.124/iron/folic (PRENATAL VITAMIN ORAL) Take by mouth.   6/13/2024 at 1400    albuterol 90 mcg/actuation inhaler Inhale 2 puffs every 6 hours if needed.       calcium carbonate (TUMS ORAL) Take by mouth.       EPINEPHrine 0.3 mg/0.3 mL injection syringe Inject 0.3 mL (0.3 mg) as directed. As Directed       famotidine (Pepcid) 20 mg tablet Take 1 tablet (20 mg) by mouth 2 times a day. 90 tablet 3 Unknown    sodium fluoride-pot nitrate 1.1-5 % paste BRUSH 2 TIMES A DAY WITH PEA SIZED AMOUNT          Objective    Last Vitals  Temp Pulse Resp BP MAP O2 Sat   36.7 °C (98 °F) 104 16 113/79   96 %     Physical Examination  GENERAL: Examination reveals a well developed, well nourished, gravid female in no acute distress. She is alert and cooperative.  LUNGS:  Normal respiratory effort.  ABDOMEN:  Soft, non-tender.  FHR baseline 145, moderate variability, + accelerations, no decelerations.  [place] reading: Irregular, every 2-5 minutes; patient reportedly not feeling all of her contractions.  The fetus is in a vertex presentation, determined by ultrasound done at bedside by Dr. Oneill.  Current Estimated Fetal Weight 7 lb., 10 oz. established by Leopold's maneuver  VAGINA: normal appearing vagina with normal color and discharge and no lesions noted  CERVIX: Fingertip cm dilated, 50 % effaced, -3 station; MEMBRANES are    EXTREMITIES:  Generalized, bilateral lower extremity  edema.  PSYCHOLOGICAL: awake and alert; oriented to person, place, and time    Lab Review  Labs in chart were reviewed.

## 2024-06-14 NOTE — PROGRESS NOTES
"Subjective:  Teodora Allen is resting comfortably in bed, has been able to sleep on and off.      Objective:  Fetal Monitoring      Baseline FHR: 120 per minute  Variability: moderate  Accelerations: yes  Decelerations: none  TOCO: irregular, every 1-8 minutes    Cervical Exam:  Unchanged, 0.5/50/-3.    Membrane status: intact    Vitals:    24 0010 24 0020 24 0428 24 0429   BP: 111/70   111/65   Pulse: 78  76 75   Resp: 16   16   Temp: 36.7 °C (98 °F)   36.6 °C (97.9 °F)   TempSrc: Oral   Oral   SpO2: (!) 95%  (!) 95% (!) 95%   Weight:  96.2 kg (211 lb 15.6 oz)     Height:  1.499 m (4' 11\")          Assessment:  38 y.o.  at 40w0d by LMP,  IOL, Latent Labor,  Cat. I FHT,  GBS neg.     Plan:  CEFM,  Pain management as desired,  25 mcg. Cytotec (dose #3) placed in the posterior fornix for cervical ripening @ 0630.  Pt. tolerated well.  Consider CRB with next cervical exam,  Encourage frequent position changes,  Anticipate NSVB.    Will update Dr. Oneill on patient status.     Nighat Rowell, APRN-CNM    "

## 2024-06-14 NOTE — PROGRESS NOTES
"S: Pt called out stating she felt a \"pop\" and fluid leaking.     O: Visit Vitals  /68   Pulse 99   Temp 36.8 °C (98.2 °F) (Oral)   Resp 16      FHR: 125 BPM, moderate variability, +accels, no decels  TOCO: contractions 2-3 mins  VE: CRB still in place, able to feel cervix around CRB  Membranes: SROM 1635    A: 38 y.o.  40w0d  Latent labor  Cat 1 tracing  GBS negative    P: Start Pitocin in 1 hr following SROM if appropriate  cEFM  Diet: clears  Frequent position changes  Dr. Albert updated  Anticipate       "

## 2024-06-14 NOTE — ANESTHESIA PREPROCEDURE EVALUATION
"Patient: Teodora Allen    Evaluation Method: In-person visit    Procedure Information    Date: 24  Procedure: Labor Epidural        40w0d 38 y.o.  IOL.      /74   Pulse 93   Temp 36.6 °C (97.9 °F) (Oral)   Resp 16   Ht 1.499 m (4' 11\")   Wt 96.2 kg (211 lb 15.6 oz)   LMP 2023 Comment: currently on  SpO2 (!) 89%   BMI 42.81 kg/m²       Relevant Problems   Neuro   (+) Anxiety   (+) Mononeuritis lower limb      GI   (+) Gastroesophageal reflux disease without esophagitis      Endocrine   (+) Obesity in pregnancy ([facility])      GYN   (+) Pregnancy ([facility])       Clinical information reviewed:   Tobacco  Allergies  Meds   Med Hx  Surg Hx   Fam Hx  Soc Hx        NPO Detail:  No data recorded     OB/Gyn Evaluation    Present Pregnancy    Patient is pregnant now.   Obstetric History                Physical Exam    Airway  Mallampati: II     Cardiovascular - normal exam     Dental - normal exam     Pulmonary - normal exam     Abdominal - normal exam             Anesthesia Plan    History of general anesthesia?: yes  History of complications of general anesthesia?: no    ASA 2     epidural   (I informed and discussed the risks and benefits of general, spinal and epidural anesthesia with the patient.  The patient expressed her understanding and her questions were answered.  A verbal consent was given by the patient.   )  Anesthetic plan and risks discussed with patient and spouse.  Use of blood products discussed with patient and spouse who.    Plan discussed with CRNA.        "

## 2024-06-14 NOTE — ANESTHESIA PREPROCEDURE EVALUATION
Patient: Teodora Allen    Evaluation Method: In-person visit    Procedure Information    Date: 06/13/24  Procedure: Labor Consult         Relevant Problems   Neuro   (+) Anxiety   (+) Mononeuritis lower limb      GI   (+) Gastroesophageal reflux disease without esophagitis      Endocrine   (+) Obesity in pregnancy ([facility])      GYN   (+) Pregnancy ([facility])       Clinical information reviewed:   Tobacco  Allergies  Meds   Med Hx  Surg Hx   Fam Hx          NPO Detail:  No data recorded     OB/Gyn Evaluation    Present Pregnancy    Patient is pregnant now.   Obstetric History                Physical Exam    Airway  Mallampati: II     Cardiovascular - normal exam     Dental - normal exam     Pulmonary - normal exam     Abdominal - normal exam             Anesthesia Plan    History of general anesthesia?: yes  History of complications of general anesthesia?: no    ASA 2     epidural   (I informed and discussed the risks and benefits of general, spinal and epidural anesthesia with the patient.  The patient expressed her understanding and her questions were answered.  A verbal consent was given by the patient.   )  Anesthetic plan and risks discussed with patient and spouse.  Use of blood products discussed with patient and spouse who.    Plan discussed with CRNA.

## 2024-06-14 NOTE — ANESTHESIA PROCEDURE NOTES
Epidural Block    Patient location during procedure: OB  Start time: 6/14/2024 10:48 AM  Reason for block: labor analgesia  Staffing  Performed: CRNA   Authorized by: MAXIME Graf    Performed by: MAXIME Graf    Preanesthetic Checklist  Completed: patient identified, IV checked, risks and benefits discussed, surgical consent, monitors and equipment checked, pre-op evaluation, timeout performed and sterile techniques followed  Block Timeout  RN/Licensed healthcare professional reads aloud to the Anesthesia provider and entire team: Patient identity, procedure with side and site, patient position, and as applicable the availability of implants/special equipment/special requirements.  Patient on coagulant treatment: no  Timeout performed at: 6/14/2024 10:50 AM  Block Placement  Patient position: sitting  Prep: DuraPrep  Sterility prep: hand, mask, cap, gloves and drape  Sedation level: no sedation  Patient monitoring: blood pressure, continuous pulse oximetry and heart rate  Approach: midline  Local numbing: lidocaine 1% to skin and subcutaneous tissues  Vertebral space: lumbar  Lumbar location: L3-L4  Epidural  Loss of resistance technique: saline  Guidance: landmark technique        Needle  Needle type: Tuohy   Needle gauge: 17  Needle length: 10.2 cm  Catheter type: end hole  Catheter size: 19 G  Catheter securement method: clear occlusive dressing    Test dose: lidocaine 1.5% with epinephrine 1-to-200,000  Test dose: lidocaine 1.5% with epinephrine 1-to-200,000  Test dose result: no positive test dose    PCEA  Medication concentration used: 0.2% Ropivacaine with 2 mcg/mL Fentanyl  Dose (mL): 5  Lockout (minutes): 20  1-Hour Limit (boluses/hr): 3  Basal Rate: 5        Assessment  Block outcome: pain improved  Number of attempts: 1  Events: no positive test dose and negative heme/CSF/paresthesia  Procedure assessment: patient tolerated procedure well with no immediate  complications  Additional Notes  Patient having no pain at this time.  Dr. Albert requested the epidural be placed due to baby's tracing.  Patient agreed to get epidural placed.

## 2024-06-14 NOTE — PROGRESS NOTES
S: Comfortable s/p epidural    O: Visit Vitals  /72   Pulse 83   Temp 36.7 °C (98.1 °F) (Oral)   Resp 16    * 1 mild range BP at 1046*    FHR: 130 BPM, moderate variability, +accels, no decels  TOCO: contractions 2-4 mins  VE: 1/80/-2  Membranes: Intact    A: 38 y.o.  40w0d  Latent labor  Cat 1 tracing  GBS negative    P: Continue IOL with CRB and potentially Pitocin  PEC labs ordered   cEFM  Diet: clears  Frequent position changes  Dr. Albert updated  Anticipate

## 2024-06-15 LAB
ERYTHROCYTE [DISTWIDTH] IN BLOOD BY AUTOMATED COUNT: 13.4 % (ref 11.5–14.5)
HCT VFR BLD AUTO: 38.6 % (ref 36–46)
HGB BLD-MCNC: 12.5 G/DL (ref 12–16)
MCH RBC QN AUTO: 28.3 PG (ref 26–34)
MCHC RBC AUTO-ENTMCNC: 32.4 G/DL (ref 32–36)
MCV RBC AUTO: 88 FL (ref 80–100)
NRBC BLD-RTO: 0 /100 WBCS (ref 0–0)
PLATELET # BLD AUTO: 215 X10*3/UL (ref 150–450)
RBC # BLD AUTO: 4.41 X10*6/UL (ref 4–5.2)
WBC # BLD AUTO: 20.3 X10*3/UL (ref 4.4–11.3)

## 2024-06-15 PROCEDURE — 36415 COLL VENOUS BLD VENIPUNCTURE: CPT | Performed by: STUDENT IN AN ORGANIZED HEALTH CARE EDUCATION/TRAINING PROGRAM

## 2024-06-15 PROCEDURE — 2500000001 HC RX 250 WO HCPCS SELF ADMINISTERED DRUGS (ALT 637 FOR MEDICARE OP): Performed by: STUDENT IN AN ORGANIZED HEALTH CARE EDUCATION/TRAINING PROGRAM

## 2024-06-15 PROCEDURE — 85027 COMPLETE CBC AUTOMATED: CPT | Performed by: STUDENT IN AN ORGANIZED HEALTH CARE EDUCATION/TRAINING PROGRAM

## 2024-06-15 PROCEDURE — 1220000001 HC OB SEMI-PRIVATE ROOM DAILY

## 2024-06-15 PROCEDURE — 2500000004 HC RX 250 GENERAL PHARMACY W/ HCPCS (ALT 636 FOR OP/ED): Performed by: STUDENT IN AN ORGANIZED HEALTH CARE EDUCATION/TRAINING PROGRAM

## 2024-06-15 RX ORDER — ONDANSETRON 4 MG/1
4 TABLET, FILM COATED ORAL EVERY 6 HOURS PRN
Status: DISCONTINUED | OUTPATIENT
Start: 2024-06-15 | End: 2024-06-17 | Stop reason: HOSPADM

## 2024-06-15 RX ORDER — ONDANSETRON HYDROCHLORIDE 2 MG/ML
4 INJECTION, SOLUTION INTRAVENOUS EVERY 6 HOURS PRN
Status: DISCONTINUED | OUTPATIENT
Start: 2024-06-15 | End: 2024-06-17 | Stop reason: HOSPADM

## 2024-06-15 RX ORDER — KETOROLAC TROMETHAMINE 30 MG/ML
30 INJECTION, SOLUTION INTRAMUSCULAR; INTRAVENOUS EVERY 6 HOURS
Status: COMPLETED | OUTPATIENT
Start: 2024-06-15 | End: 2024-06-15

## 2024-06-15 RX ORDER — IBUPROFEN 600 MG/1
600 TABLET ORAL EVERY 6 HOURS
Status: DISCONTINUED | OUTPATIENT
Start: 2024-06-16 | End: 2024-06-17 | Stop reason: HOSPADM

## 2024-06-15 RX ORDER — LIDOCAINE 560 MG/1
1 PATCH PERCUTANEOUS; TOPICAL; TRANSDERMAL
Status: DISCONTINUED | OUTPATIENT
Start: 2024-06-15 | End: 2024-06-17 | Stop reason: HOSPADM

## 2024-06-15 RX ORDER — OXYTOCIN 10 [USP'U]/ML
INJECTION, SOLUTION INTRAMUSCULAR; INTRAVENOUS AS NEEDED
Status: DISCONTINUED | OUTPATIENT
Start: 2024-06-14 | End: 2024-06-15 | Stop reason: HOSPADM

## 2024-06-15 RX ORDER — OXYTOCIN 10 [USP'U]/ML
10 INJECTION, SOLUTION INTRAMUSCULAR; INTRAVENOUS ONCE AS NEEDED
Status: DISCONTINUED | OUTPATIENT
Start: 2024-06-15 | End: 2024-06-17 | Stop reason: HOSPADM

## 2024-06-15 RX ORDER — CARBOPROST TROMETHAMINE 250 UG/ML
INJECTION, SOLUTION INTRAMUSCULAR AS NEEDED
Status: DISCONTINUED | OUTPATIENT
Start: 2024-06-14 | End: 2024-06-15 | Stop reason: HOSPADM

## 2024-06-15 RX ORDER — NIFEDIPINE 10 MG/1
10 CAPSULE ORAL ONCE AS NEEDED
Status: DISCONTINUED | OUTPATIENT
Start: 2024-06-15 | End: 2024-06-17 | Stop reason: HOSPADM

## 2024-06-15 RX ORDER — METHYLERGONOVINE MALEATE 0.2 MG/ML
0.2 INJECTION INTRAVENOUS ONCE AS NEEDED
Status: DISCONTINUED | OUTPATIENT
Start: 2024-06-15 | End: 2024-06-17 | Stop reason: HOSPADM

## 2024-06-15 RX ORDER — LOPERAMIDE HYDROCHLORIDE 2 MG/1
2 CAPSULE ORAL 4 TIMES DAILY PRN
Status: DISCONTINUED | OUTPATIENT
Start: 2024-06-15 | End: 2024-06-17 | Stop reason: HOSPADM

## 2024-06-15 RX ORDER — DIPHENHYDRAMINE HCL 25 MG
25 TABLET ORAL EVERY 4 HOURS PRN
Status: DISCONTINUED | OUTPATIENT
Start: 2024-06-15 | End: 2024-06-17 | Stop reason: HOSPADM

## 2024-06-15 RX ORDER — CARBOPROST TROMETHAMINE 250 UG/ML
250 INJECTION, SOLUTION INTRAMUSCULAR ONCE AS NEEDED
Status: DISCONTINUED | OUTPATIENT
Start: 2024-06-15 | End: 2024-06-17 | Stop reason: HOSPADM

## 2024-06-15 RX ORDER — MISOPROSTOL 200 UG/1
800 TABLET ORAL ONCE AS NEEDED
Status: DISCONTINUED | OUTPATIENT
Start: 2024-06-15 | End: 2024-06-17 | Stop reason: HOSPADM

## 2024-06-15 RX ORDER — HYDRALAZINE HYDROCHLORIDE 20 MG/ML
5 INJECTION INTRAMUSCULAR; INTRAVENOUS ONCE AS NEEDED
Status: DISCONTINUED | OUTPATIENT
Start: 2024-06-15 | End: 2024-06-17 | Stop reason: HOSPADM

## 2024-06-15 RX ORDER — DIPHENHYDRAMINE HYDROCHLORIDE 50 MG/ML
25 INJECTION INTRAMUSCULAR; INTRAVENOUS EVERY 4 HOURS PRN
Status: DISCONTINUED | OUTPATIENT
Start: 2024-06-15 | End: 2024-06-17 | Stop reason: HOSPADM

## 2024-06-15 RX ORDER — ACETAMINOPHEN 325 MG/1
975 TABLET ORAL EVERY 6 HOURS
Status: DISCONTINUED | OUTPATIENT
Start: 2024-06-15 | End: 2024-06-17 | Stop reason: HOSPADM

## 2024-06-15 RX ORDER — NALOXONE HYDROCHLORIDE 0.4 MG/ML
0.1 INJECTION, SOLUTION INTRAMUSCULAR; INTRAVENOUS; SUBCUTANEOUS EVERY 5 MIN PRN
Status: DISCONTINUED | OUTPATIENT
Start: 2024-06-15 | End: 2024-06-17 | Stop reason: HOSPADM

## 2024-06-15 RX ORDER — BISACODYL 10 MG/1
10 SUPPOSITORY RECTAL DAILY PRN
Status: DISCONTINUED | OUTPATIENT
Start: 2024-06-15 | End: 2024-06-17 | Stop reason: HOSPADM

## 2024-06-15 RX ORDER — LABETALOL HYDROCHLORIDE 5 MG/ML
20 INJECTION, SOLUTION INTRAVENOUS ONCE AS NEEDED
Status: DISCONTINUED | OUTPATIENT
Start: 2024-06-15 | End: 2024-06-17 | Stop reason: HOSPADM

## 2024-06-15 RX ORDER — ENOXAPARIN SODIUM 100 MG/ML
60 INJECTION SUBCUTANEOUS EVERY 24 HOURS
Status: DISCONTINUED | OUTPATIENT
Start: 2024-06-16 | End: 2024-06-17 | Stop reason: HOSPADM

## 2024-06-15 RX ORDER — ADHESIVE BANDAGE
30 BANDAGE TOPICAL
Status: DISCONTINUED | OUTPATIENT
Start: 2024-06-15 | End: 2024-06-17 | Stop reason: HOSPADM

## 2024-06-15 RX ORDER — SIMETHICONE 80 MG
80 TABLET,CHEWABLE ORAL 4 TIMES DAILY PRN
Status: DISCONTINUED | OUTPATIENT
Start: 2024-06-15 | End: 2024-06-17 | Stop reason: HOSPADM

## 2024-06-15 RX ORDER — TRANEXAMIC ACID 100 MG/ML
1000 INJECTION, SOLUTION INTRAVENOUS ONCE AS NEEDED
Status: DISCONTINUED | OUTPATIENT
Start: 2024-06-15 | End: 2024-06-17 | Stop reason: HOSPADM

## 2024-06-15 RX ORDER — POLYETHYLENE GLYCOL 3350 17 G/17G
17 POWDER, FOR SOLUTION ORAL 2 TIMES DAILY PRN
Status: DISCONTINUED | OUTPATIENT
Start: 2024-06-15 | End: 2024-06-17 | Stop reason: HOSPADM

## 2024-06-15 RX ORDER — LOPERAMIDE HYDROCHLORIDE 2 MG/1
4 CAPSULE ORAL EVERY 2 HOUR PRN
Status: DISCONTINUED | OUTPATIENT
Start: 2024-06-15 | End: 2024-06-17 | Stop reason: HOSPADM

## 2024-06-15 RX ORDER — OXYTOCIN/0.9 % SODIUM CHLORIDE 30/500 ML
60 PLASTIC BAG, INJECTION (ML) INTRAVENOUS ONCE AS NEEDED
Status: DISCONTINUED | OUTPATIENT
Start: 2024-06-15 | End: 2024-06-17 | Stop reason: HOSPADM

## 2024-06-15 ASSESSMENT — PAIN SCALES - GENERAL
PAINLEVEL_OUTOF10: 0 - NO PAIN
PAINLEVEL_OUTOF10: 4
PAINLEVEL_OUTOF10: 0 - NO PAIN
PAIN_LEVEL: 0
PAINLEVEL_OUTOF10: 2

## 2024-06-15 NOTE — PROGRESS NOTES
S: Pt feeling some cramping.     O: Visit Vitals  /60   Pulse 75   Temp 36.8 °C (98.2 °F) (Oral)   Resp 16      FHR: 140 BPM, minimal variability, +accels, variable decels  TOCO: contractions 2 mins  VE: 4/60/-2 (80% anteriorly)  Membranes: SROM 1630, clear    A: 38 y.o.  40w0d  latent labor  Cat 2 tracing  GBS negative    P: Discussed likelihood of C/S given signs of fetal intolerance and remote from delivery. Pt expressing she would rather just commit to a C/S delivery now than later under a more emergent    cEFM  Diet: NPO  Dr. Huston updated  Will prepare for C/S delivery

## 2024-06-15 NOTE — NURSING NOTE
"In to care for pt-farooq removed-600cc dk yellow urine removed-pt up to BR-voided small amt-pt had another \"bout' of liquid diarrhea-turner care completed by pt-pt washed up and back to clean bed-changed linens-gait steady-pt denies dizziness-pt states \"it feels so good to get up and get that catheter out\"  "

## 2024-06-15 NOTE — PROGRESS NOTES
Postpartum Progress Note    Assessment/Plan   Teodora Allen is a 38 y.o., , who delivered at 40w0d gestation    Now PPD#1 s/p , Low Transverse  on 2024     Post-operative  - Continue routine postpartum care  - Pain well controlled on po medications  - DVT risk score DVT Score: 5 , ppx with lovenox 60 daily  - Hb  12.5   -->  12.5  , acute blood loss anemia, asymptomatic, appropriate for blood loss  Results from last 7 days   Lab Units 06/15/24  0805 24  1136 24  2157   WBC AUTO x10*3/uL 20.3* 14.2* 12.6*   HEMOGLOBIN g/dL 12.5 12.5 12.2   PLATELETS AUTO x10*3/uL 215 218 244   MCV fL 88 87 88         Postpartum  Rh  positive  rubella immune  Breastfeeding, lactation consult      Cristina Lino MD     Pregnancy Problems (from 23 to present)       Problem Noted Resolved    Status post primary low transverse  section 2024 by SUNSHINE Casanova No    Priority:  Medium      Primigravida of advanced maternal age in third trimester ([facility]) 2023 by SUNSHINE Casanova No    Priority:  Medium      Obesity in pregnancy ([facility]) 2023 by SUNSHINE Casanova No    Priority:  Medium      Overview Signed 2024  4:27 PM by SUNSHINE Doan     Growth US at 30 and 36 weeks  NST/BPP weekly beginning at 37 weeks  Recommendation for IOL b/w 39.0-39.6           Pregnancy ([facility]) 2023 by SUNSHINE Casanova No    Priority:  Medium      Encounter for induction of labor ([facility]) 2024 by SUNSHINE Christensen 2024 by Luz FLOOD MD          Hospital course: no complications      Subjective      Teodora Allen is PPD#1 s/p  for fetal intolerance to labor who reports feeling overall well.  No acute events overnight.  Voiding spontaneously, passing flatus.  Pain well controlled on PO meds.  Light lochia. Tolerating diet. Does not endorse lightheadedness or palpitations or  dyspnea.      Objective   Allergies:   Bee venom protein (honey bee), Compazine [prochlorperazine], and Morphine         Last Vitals:  Temp Pulse Resp BP MAP Pulse Ox   36.5 °C (97.7 °F) 70 16 101/66   98 %     Vitals Min/Max Last 24 Hours:  Temp  Min: 36.5 °C (97.7 °F)  Max: 36.9 °C (98.4 °F)  Pulse  Min: 66  Max: 118  Resp  Min: 16  Max: 16  BP  Min: 98/58  Max: 150/91    Intake/Output:     Intake/Output Summary (Last 24 hours) at 6/15/2024 0900  Last data filed at 6/15/2024 0600  Gross per 24 hour   Intake 1438.17 ml   Output 3596 ml   Net -2157.83 ml       Physical Exam:  General: examination reveals a well developed, well nourished, female, in no acute distress. She is alert and cooperative.  HEENT: external ears normal. Nose normal, no erythema or discharge.  Neck: supple, no significant adenopathy  Lungs: breathing even and unlabored, lungs clear all fields  Cardiac: warm and well perfused, heart rate regular rate and rhythm, no murmur  Abdominal: soft, fundus firm, below umbilicus, nontender  Incision: mepilex dressing intact  Extremities: no redness or tenderness in the calves or thighs, trace edema.  Neurological: alert, oriented, normal speech, no focal findings or movement disorder noted.      Lab Data:  Lab Results   Component Value Date    WBC 20.3 (H) 06/15/2024    HGB 12.5 06/15/2024    HCT 38.6 06/15/2024     06/15/2024

## 2024-06-15 NOTE — NURSING NOTE
Pt called out that she needed a bed pan for a bowel movement-pt had liquid diarrhea into pan and the bed-cleaned up and turner care done-bed linen changed-lochia scant-faoroq care done-pt back to sleep-

## 2024-06-15 NOTE — L&D DELIVERY NOTE
OB Delivery Note  2024  Teodoraeverett Maciaskler  38 y.o.   , Low Transverse        Gestational Age: 40w0d  /Para:   Quantitative Blood Loss: Admission to Discharge: 0 mL (2024  8:00 PM - 2024 10:46 PM)    Franck Allen [[id number]]      Labor Events    Rupture date/time: 2024 1630  Rupture type: Spontaneous  Fluid color: Clear  Fluid odor: None  Labor type: Induced Onset of Labor  Labor allowed to proceed with plans for an attempted vaginal birth?: Yes  Induction: Misoprostol  First cervical ripening date/time: 2024  Complications: Fetal Intolerance       Placenta    Placenta delivery date/time:   Placenta removal:        Cord    Vessels: 3 vessels  Complications: None  Delayed cord clamping?: Yes  Cord blood disposition: Lab  Gases sent?: Yes       Lacerations    Episiotomy: None  Perineal laceration: None  Other lacerations?: No  Repair suture: None       Anesthesia    Method: Epidural        Delivery    Birth date/time: 2022 21:28:00  Delivery type: , Low Transverse   categorization: primary   priority: routine  Indications for : Fetal Intolerance of Labor  Incision type: low transverse  Complications: Fetal Intolerance       Apgars    Living status: Living  Apgar Component Scores:  1 min.:  5 min.:  10 min.:  15 min.:  20 min.:    Skin color:         Heart rate:         Reflex irritability:         Muscle tone:         Respiratory effort:         Total:                Delivery Providers    Delivering clinician: Luz FLOOD MD   Provider Role     Delivery Nurse     Nursery Nurse     Resident                 Intrauterine Device Inserted : NoLuz MD

## 2024-06-15 NOTE — NURSING NOTE
Pt to unit via cart-to room 4004-transferred to bed via self and 2 assists-oriented to room and routine-questions asked and answered-vs taken-call bell in reach-baby id bands verified with two nurses-bed set up for Dad-lights dimmed for sleep-

## 2024-06-15 NOTE — CARE PLAN
The patient's goals for the shift include to rest between feeds    The clinical goals for the shift include wnl vitals    Over the shift, the patient did make progress toward the following goa

## 2024-06-15 NOTE — NURSING NOTE
Patient requsts pca pump to be discontinued, so she can shower and move around faster. Ob crna called  order to discontinue pca per pts request

## 2024-06-15 NOTE — CARE PLAN
The patient's goals for the shift include have a baby    The clinical goals for the shift include make cervical change    Over the shift, the patient did make progress toward the following goals.

## 2024-06-15 NOTE — CARE PLAN
The patient's goals for the shift include bond with baby    The clinical goals for the shift include stable vs    Over the shift, the patient did  make progress.

## 2024-06-15 NOTE — OP NOTE
Date: 2024 - 2024  OR Location: [facility] OB    Name: Teodora Allen, : 1985, Age: 38 y.o., MRN: [id number], Sex: female    Diagnosis  IUP at 40w0d   AMA in primigravida  Class III obesity, BMI 42  Fetal intolerance to labor    Procedures    *  Section    Surgeons      * Luz Huston V - Primary    Resident/Fellow/Other Assistant:  Surgeons and Role:  * No surgeons found with a matching role *    Procedure Summary  Anesthesia: Epidural ASA: II  Anesthesia Staff: CRNA: MAXIME Graf  Estimated Blood Loss: 600mL  Intra-op Medications: * Intraprocedure medication information is unavailable because the case start and end events have not been set *      Intraprocedure I/O Totals       None           Specimen: No specimens collected     Staff:   Areli Person: Kristin  Circulator: Ernestina      Informed Consent:  The risks, benefits, complications, and alternatives were discussed with the patient. The patient understood that the risks of  section include, but are not limited to: injury to nearby structures or organs, infection, blood loss and possible need for transfusion, and potential need for more surgery including hysterectomy. The patient stated understanding and desired to proceed. All questions were answered. The site of surgery was properly noted and marked. The patient was identified as Teodora Allen and the procedure verified as a  delivery. A Time Out was held and the above information confirmed.    Procedure Details:  The patient was taken to the operating room where Epidural  anesthesia was found to be adequate. Antibiotics were given for infection prophylaxis (Ancef and Azithromycin). She was prepped and draped in the normal sterile fashion in the dorsal supine position with leftward tilt. A Pfannenstiel skin incision was made with scalpel. The incision was carried down to the fascia using the scalpel. The fascia was incised and extended  laterally with gentle traction. The rectus muscles were  bluntly in the midline. The peritoneum was identified and entered bluntly. Peritoneal incision was extended superiorly and inferiorly with good visualization of the bladder.    The bladder blade was inserted, vesicouterine peritoneum was identified. The lower uterine segment was then incised with a low transverse  incision and was extended bluntly with gentle, cephalo-caudal stretch. The amniotic sac was ruptured and Clear  color noted. The bladder blade was removed and the infant was delivered atraumatically using the usual maneuvers. The remainder of the infant was delivered, the cord clamped and cut after 45 second delay, and the baby was handed off to the awaiting clinicians.     The placenta was removed via manual massage. The uterus was then exteriorized and cleared of all clots and debris. The hysterotomy was repaired with suture of 0-Monocryl suture in a running locked fashion. 2- figure of 8 sutures using 0-Monocryl were placed at the right and left apices. Serosal bleeding noted which was cauterized using Bovie, following which hemostasis was noted. Considerable atony noted of the uterus during repair of the hysterotomy; 0.25mg of hemabate IM and 10 milliunits of pitocin (intrauterine) were administered with improvement in uterine tone. The ovaries and tubes appeared normal bilaterally. The uterus, tubes, and ovaries were then returned to the abdomen and pelvis. The uterine incision was reinspected and found to be hemostatic. The subfascial spaces were inspected and noted to be hemostatic. The fascia was then reapproximated with running 0 Vicryl suture. The subcutaneous space was inspected and found to be hemostatic following application of electrocautery. The subcutaneous tissue was re-approximated with interrupted, 3-0 Monocryl sutures. The skin was closed with 4-0 Vicryl sutures in subcuticular fashion.    The patient tolerated the  procedure well. Sponge, instrument, and needle counts were correct and the patient was taken to the recovery room in good and stable condition.    Findings: Viable female infant in cephalic presentation. Normal appearing bilateral fallopian tubes, ovaries, and normal-appearing uterus.     Complications:  None; patient tolerated the procedure well.     Disposition: PACU - hemodynamically stable.  Condition: stable    Luz Flores MD

## 2024-06-15 NOTE — SIGNIFICANT EVENT
DECISION FOR DELIVERY  2024 8:31 PM      Teodora Allen is a 38 y.o.  at 40w0d who initially presented for elective induction of labor on evening of . Her induction was initiated with vaginal misoprostol followed by CRB for cervical ripening. CRB was placed at approximately 12:30 . She experienced SROM at approximately 16:30 . Following removal of CRB, cervix 4cm dilated though remained with limited effacement. Pitocin was initiated following with recurrent variables with occasional late decelerations were noted.   Following discussion with Teodora, the decision was made to proceed with PLTCD for fetal intolerance to labor. R/B/A of  delivery discussed and patient verbalized understanding. Preoperative Hb 12.5, type & cross for 1U ordered. Ancef and azithromycin planned for preoperative prophylaxis.     Luz Flores MD

## 2024-06-15 NOTE — ANESTHESIA POSTPROCEDURE EVALUATION
"Patient: Teodora Allen    Procedure Summary       Date: 24 [place] [facility]    Anesthesia Start:  Anesthesia Stop:     Procedure:  Section Diagnosis: (fetal intolerance to labor)    Surgeons: Luz FLOOD MD Responsible Provider: MAXIME Graf    Anesthesia Type: epidural ASA Status: 2            Anesthesia Type: epidural     40w0d 38 y.o.       /78   Pulse 73   Temp 36.5 °C (97.7 °F) (Oral)   Resp 16   Ht 1.499 m (4' 11\")   Wt 96.2 kg (211 lb 15.6 oz)   LMP 2023 Comment: currently on  SpO2 96%   Breastfeeding Unknown   BMI 42.81 kg/m²         Anesthesia Post Evaluation    Patient location during evaluation: bedside  Patient participation: complete - patient participated  Level of consciousness: awake and alert  Pain score: 0  Pain management: adequate  Multimodal analgesia pain management approach  Airway patency: patent  Cardiovascular status: acceptable  Respiratory status: acceptable  Hydration status: acceptable  Postoperative Nausea and Vomiting: none        No notable events documented.    "

## 2024-06-15 NOTE — PROGRESS NOTES
Spiritual Care Visit    Clinical Encounter Type  Visited With: Patient and family together  Routine Visit: Introduction          Purpose of visit was to congratulate mom and baby.  Dad was present.  We had a nice conversation and they received the baby bible with gratitude.  Mom and Dad accepted my offer of a prayer of blessing over their baby.                                  Taxonomy  Intended Effects: Build relationship of care and support, Convey a calming presence, Demonstrate caring and concern, Afua affirmation, Helping someone feel comforted

## 2024-06-16 VITALS
RESPIRATION RATE: 17 BRPM | HEIGHT: 59 IN | BODY MASS INDEX: 42.73 KG/M2 | HEART RATE: 84 BPM | DIASTOLIC BLOOD PRESSURE: 62 MMHG | SYSTOLIC BLOOD PRESSURE: 102 MMHG | WEIGHT: 211.97 LBS | TEMPERATURE: 98.4 F | OXYGEN SATURATION: 95 %

## 2024-06-16 LAB
BLOOD EXPIRATION DATE: NORMAL
DISPENSE STATUS: NORMAL
PRODUCT BLOOD TYPE: 6200
PRODUCT CODE: NORMAL
UNIT ABO: NORMAL
UNIT NUMBER: NORMAL
UNIT RH: NORMAL
UNIT VOLUME: 350
XM INTEP: NORMAL

## 2024-06-16 PROCEDURE — 2500000004 HC RX 250 GENERAL PHARMACY W/ HCPCS (ALT 636 FOR OP/ED): Performed by: STUDENT IN AN ORGANIZED HEALTH CARE EDUCATION/TRAINING PROGRAM

## 2024-06-16 PROCEDURE — 2500000001 HC RX 250 WO HCPCS SELF ADMINISTERED DRUGS (ALT 637 FOR MEDICARE OP): Performed by: STUDENT IN AN ORGANIZED HEALTH CARE EDUCATION/TRAINING PROGRAM

## 2024-06-16 PROCEDURE — 1220000001 HC OB SEMI-PRIVATE ROOM DAILY

## 2024-06-16 ASSESSMENT — PAIN SCALES - GENERAL
PAINLEVEL_OUTOF10: 0 - NO PAIN
PAINLEVEL_OUTOF10: 3
PAINLEVEL_OUTOF10: 2
PAINLEVEL_OUTOF10: 2
PAINLEVEL_OUTOF10: 0 - NO PAIN

## 2024-06-16 NOTE — PROGRESS NOTES
Teodora Allen is a 38 y.o., , who had a , Low Transverse  delivery on 2024  at 40w0d and is now POD2.    She had Neuraxial Anesthesia without immediate complications noted.       Pain well controlled    Vitals:    24 0420   BP: 96/61   Pulse: 97   Resp: 18   Temp: 36.7 °C (98 °F)   SpO2: 95%       Neuraxial site assessed. No visible redness or swelling or drainage. Patient able to ambulate and move all extremities without difficulty. Able to void. No complaints of nausea/vomiting. Tolerating PO intake well. No s/sx of PDPH.     Anesthesia will sign off     ROME Winn-CRNA

## 2024-06-16 NOTE — PROGRESS NOTES
OB Postpartum Progress Note    POD #2 s/p pLTCS for cat 2 tracing remote from delivery    S:  Patient overall feeling well.  Pain well controlled with tylenol/motrin.  Voiding freely and passing flatus.  Breastfeeding.      O:  Vitals:    24 0810   BP: 101/70   Pulse: 95   Resp: 16   Temp: 36.6 °C (97.9 °F)   SpO2: 96%       OBGyn Exam  Gen in NAD  Abdomen soft, appropriately tender, incision c/d/I with steri strips in place, fundus firm at U-1, no rebound or guarding    No results found for this or any previous visit (from the past 24 hour(s)).    Assessment and Plan  38 y.o. y/o  POD#2 s/p pLTCS, doing well.  Routine postpartum  -A+/rubella immune  -breastfeeding  -mild range bps over a 1.5 hour time period, HELLP labs sent a normal, does not meet criteria for any HDP  -female infant   2. Routine postop  -PO pain meds  -hgb 12.5/12.5  -ADAT, IS, ambulate  -lovenox for dvt ppx  3. Dispo  -continue inpatient care, anticipate discharge home tomorrow     Ember Oneill MD

## 2024-06-16 NOTE — LACTATION NOTE
Lactation Consultant Note  Lactation Consultation  Reason for Consult: Infant weight loss  Consultant Name: Kajal Gore    Maternal Information  Has mother  before?: No  Infant to breast within first 2 hours of birth?: Yes  Exclusive Pump and Bottle Feed: No    Maternal Assessment  Breast Assessment: Large, Pendulous, Soft  Nipple Assessment: Intact, Short, Erect with stimulation, Other (Comment) (nipples flatten with compression)  Alterations in Nipple Condition: Stage I - pain or irritation with no skin break down  Areola Assessment: Other (Comment) (swollen with fluid)    Infant Assessment  Infant Behavior: Awake, Feeding cues observed, Rooting response, Sucking  Infant Assessment: Tongue protrudes over alveolar ridge, Sublingual frenulum (comment) (frenulum midline, tight. Limited elevation)    Feeding Assessment  Nutrition Source: Breastmilk  Feeding Method: Nursing at the breast  Feeding Position: Cradle, Skin to skin, Breast sandwich, Mother needs assistance with latch/positioning, Mother demonstrates good positioning  Suck/Feeding: Sustained, Clenching/biting, Baby led rhythmically, Nipple shield used  Latch Assessment: Moderate assistance is needed, Instructed on deep latch, Latch achieved, Wide open mouth < 160, Bursts of sucking, swallowing, and rest    LATCH TOOL  Latch: Grasps breast, tongue down, lips flanged, rhythmic sucking  Audible Swallowing: Spontaneous and intermittent (24 hours old)  Type of Nipple: Everted (After stimulation)  Comfort (Breast/Nipple): Filling, red/small blisters/bruises, mild/moderate discomfort  Hold (Positioning): Minimal assist, teach one side, mother does other, staff holds  LATCH Score: 8    Breast Pump  Pump: None    Other OB Lactation Tools  Lactation Tools: Nipple shields    Patient Follow-up  Inpatient Lactation Follow-up Needed : Yes  Outpatient Lactation Follow-up: Recommended    Other OB Lactation Documentation  Maternal Risk Factors: Age over 30,  primiparity,  delivery, Obesity (pre-pregnancy BMI >30), Complicated delivery ( for failure to progress)    Recommendations/Summary  , 40 weeks, Pc/s on @8 for failure to progress. Hx. Migraines and asthma, obesity. Birthweight 2820g, 4.82% weight loss. TCB 3.0@31 hours. Breasts large and pendulous, nipples short, areolas swollen. Nipples erect with stimulation, then flatten with compression. Mother reports latching with nipple shield, unable to latch without it. More difficulty latching on left side. Infant awake and showing hunger cues.  Taught ABC's of good positioning: arms around breast, infant belly to belly with parent, infant's curve of hip to parent's curve of body. Taught to have infant rest their chin on the breast below the areola. Parents instructed to wait for gape reflex elicited by chin pressure on the breast, before hugging infant close to facilitate latching. Parents demonstrate technique with minimal assistance from IBCLC to time latching. Infant able to latch deeply with wide gape and sustained rhythmical sucking with nipple shield. Mother reports more comfortable.    Plan:  Cue based feeding, at least 8-12 times in 24 hours  Frequent skin to skin  Hand express for extra volume  Call for assistance as needed    Educated parents on skin to skin, hand expression, hunger cues and feeding frequency/patterns. Discussed expected output, weight loss <10%, and normal bilirubin. Educated on AAP pacifier recommendations. Reviewed outpatient resources.    Follow up, 1630: Infant awake, crying, rooting. Parents attempting to latch. Reinforced ABC's of good positioning and latching. Assisted with latching infant in laid back hold. Infant latched with nipple shield after a few attempts with wide gape, flanged lips, bursts of sucking, swallowing, and rest. Parents report latched independently at 1330 feeding, then infant slept for 3 hours. Reinforced early hunger cues and cluster  feeding.

## 2024-06-16 NOTE — CARE PLAN
The patient's goals for the shift include to  on demand    The clinical goals for the shift include to have wnl vitals and lochia    Over the shift, the patient did make progress toward the following goals.

## 2024-06-16 NOTE — CARE PLAN
The patient's goals for the shift include to rest between feeds    The clinical goals for the shift include wnl vitals

## 2024-06-16 NOTE — PROGRESS NOTES
Teodora Allen is a 38 y.o., , who had a , Low Transverse  delivery on 2024  at 40w0d and is now POD2.    She had Neuraxial Anesthesia without immediate complications noted.       Pain well controlled    Vitals:    24 1600   BP: 107/64   Pulse: 82   Resp: 16   Temp: 36.6 °C (97.9 °F)   SpO2: 95%       Neuraxial site assessed. No visible redness or swelling or drainage. Patient able to ambulate and move all extremities without difficulty. Able to void. No complaints of nausea/vomiting. Tolerating PO intake well. No s/sx of PDPH.     Anesthesia will sign off     Amalia Mueller, ROME-CRNA

## 2024-06-17 VITALS
WEIGHT: 211.97 LBS | SYSTOLIC BLOOD PRESSURE: 124 MMHG | TEMPERATURE: 98.1 F | HEART RATE: 95 BPM | RESPIRATION RATE: 18 BRPM | DIASTOLIC BLOOD PRESSURE: 74 MMHG | BODY MASS INDEX: 42.73 KG/M2 | OXYGEN SATURATION: 97 % | HEIGHT: 59 IN

## 2024-06-17 PROCEDURE — 2500000001 HC RX 250 WO HCPCS SELF ADMINISTERED DRUGS (ALT 637 FOR MEDICARE OP): Performed by: STUDENT IN AN ORGANIZED HEALTH CARE EDUCATION/TRAINING PROGRAM

## 2024-06-17 PROCEDURE — 2500000004 HC RX 250 GENERAL PHARMACY W/ HCPCS (ALT 636 FOR OP/ED): Performed by: STUDENT IN AN ORGANIZED HEALTH CARE EDUCATION/TRAINING PROGRAM

## 2024-06-17 RX ORDER — IBUPROFEN 600 MG/1
600 TABLET ORAL EVERY 6 HOURS PRN
COMMUNITY
Start: 2024-06-17

## 2024-06-17 RX ORDER — POLYETHYLENE GLYCOL 3350 17 G/17G
17 POWDER, FOR SOLUTION ORAL DAILY
COMMUNITY
Start: 2024-06-17

## 2024-06-17 RX ORDER — ACETAMINOPHEN 500 MG
1000 TABLET ORAL EVERY 6 HOURS PRN
COMMUNITY
Start: 2024-06-17

## 2024-06-17 ASSESSMENT — PAIN SCALES - GENERAL
PAINLEVEL_OUTOF10: 1
PAINLEVEL_OUTOF10: 0 - NO PAIN

## 2024-06-17 NOTE — CARE PLAN
Problem: Pain - Adult  Goal: Verbalizes/displays adequate comfort level or baseline comfort level  Outcome: Progressing     Problem: Discharge Planning  Goal: Discharge to home or other facility with appropriate resources  Outcome: Progressing     Problem: Chronic Conditions and Co-morbidities  Goal: Patient's chronic conditions and co-morbidity symptoms are monitored and maintained or improved  Outcome: Progressing     Problem: Postpartum  Goal: Experiences normal postpartum course  Outcome: Progressing  Goal: Appropriate maternal -  bonding  Outcome: Progressing  Goal: Establish and maintain infant feeding pattern for adequate nutrition  Outcome: Progressing  Goal: Incisions, wounds, or drain sites healing without S/S of infection  Outcome: Progressing  Goal: No s/sx infection  Outcome: Progressing  Goal: No s/sx of hemorrhage  Outcome: Progressing  Goal: Minimal s/sx of HDP and BP<160/110  Outcome: Progressing     Problem:  Recovery Care  Goal: Verbalizes understanding of post-op instructions  Outcome: Progressing  Goal: Manages discomfort  Outcome: Progressing  Goal: Dressing intact until removed with any drainage marked  Outcome: Progressing  Goal: Patient vital signs are stable  Outcome: Progressing  Goal: Urine output is 0.5 mL/kg/hr or more  Outcome: Progressing  Goal: Fundus firm at midline  Outcome: Progressing         The patient's goals for the shift include Learn more cues for feeding baby    The clinical goals for the shift include Maintain same level of drainage on dressing    Over the shift, the patient did not make progress toward the following goals.

## 2024-06-17 NOTE — DISCHARGE SUMMARY
Discharge Summary    Admission Date: 2024  Discharge Date: 24     Discharge Diagnosis  Obesity in pregnancy ([facility])    Hospital Course  Delivery Date: 2024  9:28 PM   Delivery type: , Low Transverse    GA at delivery: 40w0d  Outcome: Living   Anesthesia during delivery: Epidural   Intrapartum complications: Fetal Intolerance   Feeding method: Breastfeeding Status: Yes     Patient presented for scheduled IOL. On , she underwent pLTCS for fetal intolerance of labor without complication. Postpartum course uncomplicated. Hgb equilibrated to 12.5. Rh positive, rubella immune. Meeting all milestones. No blood pressure issues. Discharged home in stable condition on POD#3.     Pertinent Physical Exam At Time of Discharge  See progress note from day of discharge    Discharge Meds     Your medication list        START taking these medications        Instructions Last Dose Given Next Dose Due   acetaminophen 500 mg tablet  Commonly known as: Tylenol Extra Strength      Take 2 tablets (1,000 mg) by mouth every 6 hours if needed for mild pain (1 - 3).       ibuprofen 600 mg tablet      Take 1 tablet (600 mg) by mouth every 6 hours if needed for mild pain (1 - 3).       polyethylene glycol 17 gram packet  Commonly known as: Glycolax, Miralax      Take 17 g by mouth once daily.              CONTINUE taking these medications        Instructions Last Dose Given Next Dose Due   albuterol 90 mcg/actuation inhaler           BLACK COHOSH ORAL           EPINEPHrine 0.3 mg/0.3 mL injection syringe  Commonly known as: Epipen           famotidine 20 mg tablet  Commonly known as: Pepcid      Take 1 tablet (20 mg) by mouth 2 times a day.       omeprazole 20 mg DR capsule  Commonly known as: PriLOSEC      Take 1 capsule (20 mg) by mouth once daily. Do not crush or chew.       PRENATAL VITAMIN ORAL           sodium fluoride-pot nitrate 1.1-5 % paste           TUMS ORAL                  STOP taking these  medications      aspirin 81 mg EC tablet                  Where to Get Your Medications        You can get these medications from any pharmacy    You don't need a prescription for these medications  acetaminophen 500 mg tablet  ibuprofen 600 mg tablet  polyethylene glycol 17 gram packet          Complications Requiring Follow-Up  None    Test Results Pending At Discharge  Pending Labs       No current pending labs.            Outpatient Follow-Up  No future appointments.      Olivia Phillips MD

## 2024-06-17 NOTE — DISCHARGE INSTRUCTIONS
Verbal and written discharge instructions reviewed with mother, to include post birth warning signs, medication reconciliation, follow up appointments, and  care. Patient verbalized understanding of.

## 2024-06-17 NOTE — PROGRESS NOTES
Postpartum Progress Note    Assessment/Plan   Teodora Allen is a 38 y.o., , who delivered at 40w0d gestation and is now postpartum day 3.    s/p CS  -Pain well controlled on current regimen, not requiring oxycodone  -Afebrile, ambulating, passing flatus  -Tolerating regular diet with anti-emetics PRN and bowel regimen ordered  -Voiding spontaneously  -Admission hgb 12.5 --> EBL 671cc --> POD#1 hgb 12.5; Continue to monitor for si/sx of anemia.   -Rh positive  -Rubella immune  -Lactation consultant PRN    DVT prophylaxis  -VTE risk score = 5, PPx lovenox  -SCDs, ambulation    Dispo: meeting milestones, home today. Plan for follow up in 1 week for incision check and in 4-6 weeks for PP visit.     Olivia Phillips MD     Principal Problem:    Obesity in pregnancy ([facility])  Active Problems:    Primigravida of advanced maternal age in third trimester ([facility])    Gastroesophageal reflux disease without esophagitis    Status post primary low transverse  section    Pregnancy Problems (from 23 to present)       Problem Noted Resolved    Status post primary low transverse  section 2024 by SUNSHINE Casanova No    Priority:  Medium      Primigravida of advanced maternal age in third trimester ([facility]) 2023 by SUNSHINE Casanova No    Priority:  Medium      Obesity in pregnancy ([facility]) 2023 by SUNSHINE Casanova No    Priority:  Medium      Overview Signed 2024  4:27 PM by SUNSHINE Doan     Growth US at 30 and 36 weeks  NST/BPP weekly beginning at 37 weeks  Recommendation for IOL b/w 39.0-39.6           Pregnancy ([facility]) 2023 by SUNSHINE Casanova No    Priority:  Medium      Encounter for induction of labor ([facility]) 2024 by SUNSHINE Christensen 2024 by Luz FLOOD MD          Hospital course: no complications      Subjective   Pain well-controlled on tylenol/motrin. Tolerating PO w/o n/v.  Ambulating, voiding, passing flatus. Breast feeding. Baby doing well.     Objective   Allergies:   Bee venom protein (honey bee), Compazine [prochlorperazine], and Morphine         Last Vitals:  Temp Pulse Resp BP MAP Pulse Ox   36.7 °C (98.1 °F) 95 18 124/74   97 %     Vitals Min/Max Last 24 Hours:  Temp  Min: 36.6 °C (97.9 °F)  Max: 36.9 °C (98.4 °F)  Pulse  Min: 79  Max: 95  Resp  Min: 16  Max: 18  BP  Min: 95/64  Max: 124/74    Intake/Output:   No intake or output data in the 24 hours ending 06/17/24 1017    Physical Exam:  Gen: awake, alert  Head: NCAT  HEENT: moist mucus membranes  Pulm: breathing comfortably on room air  CV: warm and well-perfused  Abd: soft, appropriately tender to palpation, incision c/d/I with steri strips in place  Neuro: alert and oriented  Psych: appropriate affect     Lab Data:  Labs in chart were reviewed.

## 2024-06-17 NOTE — LACTATION NOTE
Lactation Consultant Note  Lactation Consultation  Reason for Consult: Follow-up assessment  Consultant Name: Kajal Gore    Maternal Information  Has mother  before?: No  Infant to breast within first 2 hours of birth?: Yes  Exclusive Pump and Bottle Feed: No  WIC Program: No    Maternal Assessment  Breast Assessment: Large, Pendulous, Filling, Compressible  Nipple Assessment: Intact, Short, Erect with stimulation  Alterations in Nipple Condition: Stage I - pain or irritation with no skin break down  Areola Assessment: Normal    Infant Assessment  Infant Behavior: Awake, Crying, Fussy, Rooting response, Sucking  Infant Assessment: Tongue protrudes over alveolar ridge, Sublingual frenulum (comment) (frenulum midline, tight. Limited elevation)    Feeding Assessment  Nutrition Source: Breastmilk  Feeding Method: Nursing at the breast, Feeding expressed breastmilk, Syringe feeding  Feeding Position: Cradle, Breast sandwich, Mother needs assistance with latch/positioning  Suck/Feeding: Sustained, Baby led rhythmically, Audible swallowing, Nipple shield used  Latch Assessment: Minimal assistance is needed, Instructed on deep latch, Wide open mouth < 160, Bursts of sucking, swallowing, and rest, Latch achieved after repeated attempts    LATCH TOOL  Latch: Repeated attempts, hold nipple in mouth, stimulate to suck  Audible Swallowing: Spontaneous and intermittent (24 hours old)  Type of Nipple: Everted (After stimulation)  Comfort (Breast/Nipple): Soft/non-tender  Hold (Positioning): Minimal assist, teach one side, mother does other, staff holds  LATCH Score: 8    Breast Pump  Pump: Hand expression  Frequency: Other (comment) (as needed for difficulty latching)  Duration: Less than 15 minutes per session  Volume of Milk Production: 1  Units of Volume: mL per session    Other OB Lactation Tools  Lactation Tools: Nipple shields    Patient Follow-up  Inpatient Lactation Follow-up Needed : No  Outpatient Lactation  "Follow-up: Scheduled (Scheudled with Janna Newton @1200)  Lactation Professional - OK to Discharge: Yes    Other OB Lactation Documentation  Maternal Risk Factors: Age over 30, primiparity,  delivery, Obesity (pre-pregnancy BMI >30), Complicated delivery ( for failure to progress)    Recommendations/Summary  See previous note for history. 7.62% weight loss. TCB 2.5@54 hours. Parents report infant fussing and feeding frequently overnight, reassured WNL, cluster feeding.     LC called to bedside for feeding. Infant crying, frantic, parents struggling to latch. Reinforced ABC's of good positioning, attempted to latch for a few minutes, infant too fussy, not latching. Assisted with HE, mother did return demonstration, 1mL expressed and syringe fed to infant for calming, then attempted to latch again. Taught mother to put rolled washcloth under breast to assist with propping it up and encouraged better breast support with hand. Reviewed good positioning for cradle hold. Infant latched with nipple shield. Wide gape, flanged lips, bursts of sucking, swallowing, and rest.  Discussed early hunger cues, calming techniques, and giving an \"appetizer\" of EBM before latching to minimize infant being frantic at breast. Reviewed placement of nipple shield.   Nipples measured for correct flange sizing, given \"the flange FITS guide\" handout.     Appointment with outpatient LC scheduled for @12pm.    Discharge teaching reviewed. Taught engorgement management. Reviewed s/s of plugged ducts and mastitis and treatment. Reviewed normal feeding patterns of the “4th trimester” and outpatient support.    "

## 2024-06-19 ENCOUNTER — APPOINTMENT (OUTPATIENT)
Dept: LACTATION | Facility: CLINIC | Age: 39
End: 2024-06-19
Payer: COMMERCIAL

## 2024-06-19 ENCOUNTER — LACTATION CONSULT (OUTPATIENT)
Dept: LACTATION | Facility: CLINIC | Age: 39
End: 2024-06-19
Payer: COMMERCIAL

## 2024-06-19 DIAGNOSIS — O92.70 DISORDER OF LACTATION (HHS-HCC): Primary | ICD-10-CM

## 2024-06-19 PROCEDURE — 99211 OFF/OP EST MAY X REQ PHY/QHP: CPT | Performed by: ADVANCED PRACTICE MIDWIFE

## 2024-06-19 ASSESSMENT — ENCOUNTER SYMPTOMS
OCCASIONAL FEELINGS OF UNSTEADINESS: 0
LOSS OF SENSATION IN FEET: 0
DEPRESSION: 0

## 2024-06-19 ASSESSMENT — COLUMBIA-SUICIDE SEVERITY RATING SCALE - C-SSRS
1. IN THE PAST MONTH, HAVE YOU WISHED YOU WERE DEAD OR WISHED YOU COULD GO TO SLEEP AND NOT WAKE UP?: NO
2. HAVE YOU ACTUALLY HAD ANY THOUGHTS OF KILLING YOURSELF?: NO
6. HAVE YOU EVER DONE ANYTHING, STARTED TO DO ANYTHING, OR PREPARED TO DO ANYTHING TO END YOUR LIFE?: NO

## 2024-06-19 NOTE — PATIENT INSTRUCTIONS
Problems latching baby to breast: Baby should latch to areola (dark area) not just the nipple.  Baby's lips should be flanged outward.  Bring the baby up to the level of your breast by putting a pillow under the baby.  Do not use bottles or pacifiers until latching on well.  Dribble milk over the nipple or express milk so that baby can taste it  Encourage a deeper latch with the asymetrical latch- lining baby's nose opposite mother's nipple.  Encourage nipple to stand up prior to feeing by pumping, nipple rolling or by brief application of ice.  Gently tickle your baby's lip with your nipple to encourage your baby to open his/her mouth wide.  Hold baby so that your breast is positioned deep in the baby's mouth.  Hold your baby close, tummy to tummy.  If baby does not latch to breast, express breast milk.  If engorged, express some milk to soften breast.  If the baby is not latched on well, break seal and gently try again.  Keep baby skin to skin and watch for feeding cues.  Massage breast to start milk-ejection reflex.  Place nipple and at least 1 inch of areola into baby's mouth.  Place your hand behind the baby's neck and shoulder.  Do not force baby's head into breast.  Put baby in the football hold or clutch hold, supporting his/her neck and head in sniffing position to open his/her throat.  Shape breast into oval shape (sandwich hold)  for deep latch.  Try different feeding positions (cradle, football, side etc.).  Use a breast feeding pillow to bring baby up to the level of the breast.  Use nipple shield and monitor baby's weight gain and output.  Use semi-reclined feeding position to allow baby to take an active role and trigger baby's inborn feeding behavior.  Use your hand to support your breast during feeding.  When your baby's mouth is wide open, quickly pull baby into your breast.  Your baby's chin should be pressed into your breast.    Cammie pre feeding weight is 2549 grams (#5-9.9)    Breastmilk  storage 4 hours at room temp, 4 days in the refrigerator, months in the freezer

## 2024-06-19 NOTE — PROGRESS NOTES
Lactation Counseling Note    Subjective:    Teodora Allen is a 38 y.o. patient referred for lactation counseling. She is here today due to Latch issues. She was referred by her OB/GYN Zan .     OB HISTORY:   Baby Name: Cammie  /Para: : 1  Para: 1  Weeks Gestation: 40  Mode of Delivery: Primary  section  Induction/Augmentation  Epidural/General Anesthesia  Mom has elevated BMI  Delivery Complications: None  Maternal Complications: Preeclampsia  [place] Information: : 24  Place of Birth: [place]  APGARS: Unknown by parent here today.  Skin to skin contact: First hour  Birth weight: 6 lb 3 oz  Birth length: 18.5 inches  Discharge weight: down 4% first day #5-11.8    Objective:    BREASTFEEDING ASSESSMENT:   Physical Exam    Breast Assessment: Large, Pendulous, and Breast changes observed in pregnancy? Yes, describe: hagan  Nipple Assessment/Stage: intact and red/inflamed none  Areola: Reddened  Feeding Position: cross - cradle and football/seated  Latch: reluctant, maximum assistance is needed, eagerly grasped on to latch, latch achieved, and latch is painful  Suck/Feeding: sustained, disorganized suck, audible swallowing, and nipple shield used  Infant Feeding Method: Breast milk only  Infant Behavior: awake, crying, and content after feeding  Infant Information: Good cupping of tongue  Good lateral movement of the tongue  Able to elevate tongue to roof of mouth  Fontanel: flat  Mucous Membranes: moist  Breast Pain: Pain scale 0-10 (10 most pain): 0  Nipple Pain: Pain scale 0-10 (10 most pain): 0  Weight: Reported pediatrician visit weight: down 11 %  Date of pediatrician weight: 24  Weight before feedin gm  Weight after feedin gm  Amount of breast milk transferred: 42mls redressed and to first breast again for dessert  took 9 more 51 total ml  Number of voids in the last 24 hours: 4  Number of stools in the last 24 hours: 6-8    PATIENT DISCUSSION  SUMMARY:    LACTATION PROBLEMS AND STRATEGIES:  Problems latching baby to breast: Baby should latch to areola (dark area) not just the nipple.  Baby's lips should be flanged outward.  Bring the baby up to the level of your breast by putting a pillow under the baby.  Do not use bottles or pacifiers until latching on well.  Dribble milk over the nipple or express milk so that baby can taste it  Encourage a deeper latch with the asymetrical latch- lining baby's nose opposite mother's nipple.  Encourage nipple to stand up prior to feeing by pumping, nipple rolling or by brief application of ice.  Gently tickle your baby's lip with your nipple to encourage your baby to open his/her mouth wide.  Hold baby so that your breast is positioned deep in the baby's mouth.  Hold your baby close, tummy to tummy.  If baby does not latch to breast, express breast milk.  If engorged, express some milk to soften breast.  If the baby is not latched on well, break seal and gently try again.  Keep baby skin to skin and watch for feeding cues.  Massage breast to start milk-ejection reflex.  Place nipple and at least 1 inch of areola into baby's mouth.  Place your hand behind the baby's neck and shoulder.  Do not force baby's head into breast.  Put baby in the football hold or clutch hold, supporting his/her neck and head in sniffing position to open his/her throat.  Shape breast into oval shape (sandwich hold)  for deep latch.  Try different feeding positions (cradle, football, side etc.).  Use a breast feeding pillow to bring baby up to the level of the breast.  Use nipple shield and monitor baby's weight gain and output.  Use semi-reclined feeding position to allow baby to take an active role and trigger baby's inborn feeding behavior.  Use your hand to support your breast during feeding.  When your baby's mouth is wide open, quickly pull baby into your breast.  Your baby's chin should be pressed into your breast.  PATIENT INSTRUCTION  HANDOUTS GIVEN:   Foods that promote good milk production  Breastfeeding: Tips to increase your milk supply (PI# 162)  Support group Flyer  LACTATION EDUCATION:  Waking Techniques, Correct Positioning, Correct Latch On, and Demo use of Pump

## 2024-06-20 ENCOUNTER — TELEPHONE (OUTPATIENT)
Dept: LACTATION | Facility: CLINIC | Age: 39
End: 2024-06-20
Payer: COMMERCIAL

## 2024-06-24 ENCOUNTER — APPOINTMENT (OUTPATIENT)
Dept: OBSTETRICS AND GYNECOLOGY | Facility: CLINIC | Age: 39
End: 2024-06-24
Payer: COMMERCIAL

## 2024-06-24 VITALS
HEIGHT: 60 IN | SYSTOLIC BLOOD PRESSURE: 115 MMHG | WEIGHT: 197 LBS | BODY MASS INDEX: 38.68 KG/M2 | DIASTOLIC BLOOD PRESSURE: 79 MMHG

## 2024-06-24 DIAGNOSIS — Z30.09 COUNSELING FOR BIRTH CONTROL, ORAL CONTRACEPTIVES: ICD-10-CM

## 2024-06-24 DIAGNOSIS — Z09 POSTOP CHECK: ICD-10-CM

## 2024-06-24 PROBLEM — Z34.90 PREGNANCY (HHS-HCC): Status: RESOLVED | Noted: 2023-11-06 | Resolved: 2024-06-24

## 2024-06-24 PROBLEM — O09.513 PRIMIGRAVIDA OF ADVANCED MATERNAL AGE IN THIRD TRIMESTER (HHS-HCC): Status: RESOLVED | Noted: 2023-11-06 | Resolved: 2024-06-24

## 2024-06-24 PROBLEM — O99.210 OBESITY IN PREGNANCY (HHS-HCC): Status: RESOLVED | Noted: 2023-11-06 | Resolved: 2024-06-24

## 2024-06-24 PROBLEM — Z98.891 STATUS POST PRIMARY LOW TRANSVERSE CESAREAN SECTION: Status: RESOLVED | Noted: 2024-06-14 | Resolved: 2024-06-24

## 2024-06-24 PROCEDURE — 0503F POSTPARTUM CARE VISIT: CPT | Performed by: STUDENT IN AN ORGANIZED HEALTH CARE EDUCATION/TRAINING PROGRAM

## 2024-06-24 RX ORDER — NORETHINDRONE 0.35 MG/1
1 TABLET ORAL DAILY
Qty: 84 TABLET | Refills: 3 | Status: SHIPPED | OUTPATIENT
Start: 2024-06-24 | End: 2025-06-24

## 2024-06-24 ASSESSMENT — EDINBURGH POSTNATAL DEPRESSION SCALE (EPDS)
THE THOUGHT OF HARMING MYSELF HAS OCCURRED TO ME: NEVER
I HAVE BEEN ABLE TO LAUGH AND SEE THE FUNNY SIDE OF THINGS: AS MUCH AS I ALWAYS COULD
I HAVE FELT SCARED OR PANICKY FOR NO GOOD REASON: NO, NOT MUCH
I HAVE BLAMED MYSELF UNNECESSARILY WHEN THINGS WENT WRONG: NOT VERY OFTEN
TOTAL SCORE: 5
I HAVE BEEN ANXIOUS OR WORRIED FOR NO GOOD REASON: HARDLY EVER
I HAVE BEEN SO UNHAPPY THAT I HAVE BEEN CRYING: ONLY OCCASIONALLY
THINGS HAVE BEEN GETTING ON TOP OF ME: NO, MOST OF THE TIME I HAVE COPED QUITE WELL
I HAVE BEEN SO UNHAPPY THAT I HAVE HAD DIFFICULTY SLEEPING: NOT AT ALL
I HAVE FELT SAD OR MISERABLE: NO, NOT AT ALL
I HAVE LOOKED FORWARD WITH ENJOYMENT TO THINGS: AS MUCH AS I EVER DID

## 2024-06-24 NOTE — PROGRESS NOTES
Subjective:  Teodora Allen is a 38 y.o.  now POD#10 from uncomplicated pLTCS for fetal intolerance of labor presenting for post-operative incision check.      She is overall doing well at home. She is taking tylenol for pain. She is eating, drinking, voiding, and having bowel movements.  Lochia light. Baby is breast and formula feeding and doing well. Mood good, some anxiety but well-controlled.     Objective:  Vitals:    24 1014   BP: 115/79     Gen: NAD  Abd: soft, NTND  Skin: clean, dry, and intact; no erythema, discharge, or bleeding  Neuro: alert and oriented  Psych: appropriate affect    Assessment and Plan:  Teodora Allen is a  now POD#10 from uncomplicated pLTCS for fetal intolerance of labor presenting for post-operative incision check.      Post-Op  -Meeting milestones  -Incision well-approximated without si/sx of infection  -Continue pelvic rest and lifting precautions    Postpartum  -Feeding: breast and formula  -Contraception: POPs, Rx'ed today  -Mood: EPDS 5    RTC in 4 weeks for postpartum visit    Olivia Phillips MD

## 2024-07-16 ENCOUNTER — TELEPHONE (OUTPATIENT)
Dept: OBSTETRICS AND GYNECOLOGY | Facility: CLINIC | Age: 39
End: 2024-07-16
Payer: COMMERCIAL

## 2024-07-16 NOTE — TELEPHONE ENCOUNTER
Patient is roughly 5 weeks postpartum, called OB line tearful she may be experiencing PPD. She has been crying uncontrollably and has feeling of guilt that she is not being the best mother she could possibly be. Today was the first day her  went back to work and she is feeling overwhelmed. While I was on the phone with the patient she verbalized she did not want to hurt herself or the baby, and we conversed on the phone until the patient's mother arrived to the house to help, and her  gets off of work at 6pm. She states he is very helpful with the baby. I scheduled patient for a post-partum visit tomorrow morning in [place] office.

## 2024-07-17 ENCOUNTER — POSTPARTUM VISIT (OUTPATIENT)
Dept: OBSTETRICS AND GYNECOLOGY | Facility: CLINIC | Age: 39
End: 2024-07-17
Payer: COMMERCIAL

## 2024-07-17 VITALS
WEIGHT: 193.2 LBS | SYSTOLIC BLOOD PRESSURE: 117 MMHG | HEIGHT: 60 IN | BODY MASS INDEX: 37.93 KG/M2 | DIASTOLIC BLOOD PRESSURE: 84 MMHG

## 2024-07-17 DIAGNOSIS — F41.8 POSTPARTUM ANXIETY (HHS-HCC): ICD-10-CM

## 2024-07-17 PROCEDURE — 99214 OFFICE O/P EST MOD 30 MIN: CPT

## 2024-07-17 RX ORDER — HYDROXYZINE HYDROCHLORIDE 10 MG/1
10 TABLET, FILM COATED ORAL EVERY 8 HOURS PRN
Qty: 30 TABLET | Refills: 2 | Status: SHIPPED | OUTPATIENT
Start: 2024-07-17 | End: 2024-08-16

## 2024-07-17 ASSESSMENT — EDINBURGH POSTNATAL DEPRESSION SCALE (EPDS)
I HAVE FELT SAD OR MISERABLE: YES, QUITE OFTEN
I HAVE BEEN ABLE TO LAUGH AND SEE THE FUNNY SIDE OF THINGS: NOT QUITE SO MUCH NOW
I HAVE BLAMED MYSELF UNNECESSARILY WHEN THINGS WENT WRONG: YES, MOST OF THE TIME
I HAVE FELT SCARED OR PANICKY FOR NO GOOD REASON: YES, QUITE A LOT
I HAVE BEEN SO UNHAPPY THAT I HAVE BEEN CRYING: YES, QUITE OFTEN
THINGS HAVE BEEN GETTING ON TOP OF ME: YES, SOMETIMES I HAVEN'T BEEN COPING AS WELL AS USUAL
I HAVE LOOKED FORWARD WITH ENJOYMENT TO THINGS: DEFINITELY LESS THAN I USED TO
I HAVE BEEN ANXIOUS OR WORRIED FOR NO GOOD REASON: YES, VERY OFTEN
TOTAL SCORE: 20
I HAVE BEEN SO UNHAPPY THAT I HAVE HAD DIFFICULTY SLEEPING: YES, SOMETIMES
THE THOUGHT OF HARMING MYSELF HAS OCCURRED TO ME: NEVER

## 2024-07-17 NOTE — PROGRESS NOTES
Subjective   38 y.o.  presenting for postpartum follow-up for concerns of PPD and PPA.   Delivery Date: 2024  Type of Delivery:     Intrapartum complications: none      PP Recovery:   Pain: controlled  Lochia: resolved  Feeding Method: She is bottle feeding no breast or nursing problems as she had difficulty breast feeding and was not making an adequate supply  Lactation Consult Needed?: No  Birth Trauma: No  Bonding with Baby: well with baby girlCammie  Sexual Intimacy: No  Contraceptive Method:  OCP  Depression - Yes. Patient crying in office today. Patient reports that over the last two weeks patient has felt a shift in herself and she feels like a failure. She reports intrusive thoughts. She states that when she carries her baby she is afraid that she will injure her by hitting her head on something which makes her hesitant to carry her downstairs. Patient reports that she hates to hear the baby cry. BF has not gone well and she recently switched to bottle and she reports guilt over this as well. She is having difficulty sleeping as she is afraid that Cammie will stop breathing.      Patient does have a history of anxiety and depression as a teenager after her father's suicide. Patient was medicated at that time though has been off meds since.     Currently patient denies any thoughts of self harm or inflicting harm on others  Postpartum Depression: High Risk (2024)    [place]  Depression Scale     Last EPDS Total Score: 20     Last EPDS Self Harm Result: Never     Emotional Support - Mother and   Bowel Symptoms - Negative for abdominal discomfort, blood in stool or black stool, and negative change in bowel habits.  Bladder Symptoms - No dysuria, denies hematuria, urinary frequency, urinary urgency or incontinence.   Menses Since Delivery - no    Physical Exam  Pulmonary:      Effort: Pulmonary effort is normal.   Neurological:      Mental Status: She is alert.    Psychiatric:         Attention and Perception: Attention normal.         Mood and Affect: Mood is depressed. Affect is tearful.         Thought Content: Thought content does not include suicidal ideation. Thought content does not include homicidal plan.          Plan      A/P. 38 y.o. now , s/p primary  normal recovery course  F/U in three weeks for physical and to re-evaluate  Patient to be referred to DMITRY Cevallos and  [facility]. [place] to facilitate scheduling  Will send rx for Zoloft to pharmacy. Patient to take 25 mg daily x 4 days and increase to 50 mg daily thereafter. Will send rx for 10 mg hydroxyzine PRN for anxiety  Patient encouraged to reach out to this provider for any additional concerns and to seek help immediately if she has any thoughts of self harm, or inflicting harm on others.       ROME Doan-JOSHUA

## 2024-08-06 ENCOUNTER — APPOINTMENT (OUTPATIENT)
Dept: OBSTETRICS AND GYNECOLOGY | Facility: CLINIC | Age: 39
End: 2024-08-06
Payer: COMMERCIAL

## 2024-08-14 ENCOUNTER — APPOINTMENT (OUTPATIENT)
Dept: BEHAVIORAL HEALTH | Facility: CLINIC | Age: 39
End: 2024-08-14
Payer: COMMERCIAL

## 2024-08-21 ENCOUNTER — APPOINTMENT (OUTPATIENT)
Dept: OBSTETRICS AND GYNECOLOGY | Facility: CLINIC | Age: 39
End: 2024-08-21
Payer: COMMERCIAL

## 2024-08-21 VITALS — SYSTOLIC BLOOD PRESSURE: 118 MMHG | BODY MASS INDEX: 38.83 KG/M2 | DIASTOLIC BLOOD PRESSURE: 87 MMHG | WEIGHT: 198.8 LBS

## 2024-08-21 DIAGNOSIS — Z30.09 COUNSELING FOR BIRTH CONTROL, ORAL CONTRACEPTIVES: ICD-10-CM

## 2024-08-21 PROCEDURE — 0503F POSTPARTUM CARE VISIT: CPT

## 2024-08-21 RX ORDER — NORETHINDRONE 0.35 MG/1
1 TABLET ORAL DAILY
Qty: 84 TABLET | Refills: 3 | Status: SHIPPED | OUTPATIENT
Start: 2024-08-21 | End: 2025-08-21

## 2024-08-21 RX ORDER — SERTRALINE HYDROCHLORIDE 50 MG/1
50 TABLET, FILM COATED ORAL DAILY
COMMUNITY
Start: 2024-08-13

## 2024-08-21 ASSESSMENT — EDINBURGH POSTNATAL DEPRESSION SCALE (EPDS)
THINGS HAVE BEEN GETTING ON TOP OF ME: NO, MOST OF THE TIME I HAVE COPED QUITE WELL
I HAVE BEEN SO UNHAPPY THAT I HAVE HAD DIFFICULTY SLEEPING: NOT AT ALL
THE THOUGHT OF HARMING MYSELF HAS OCCURRED TO ME: NEVER
I HAVE BLAMED MYSELF UNNECESSARILY WHEN THINGS WENT WRONG: NOT VERY OFTEN
I HAVE FELT SCARED OR PANICKY FOR NO GOOD REASON: NO, NOT MUCH
I HAVE LOOKED FORWARD WITH ENJOYMENT TO THINGS: AS MUCH AS I EVER DID
I HAVE BEEN SO UNHAPPY THAT I HAVE BEEN CRYING: NO, NEVER
TOTAL SCORE: 4
I HAVE BEEN ANXIOUS OR WORRIED FOR NO GOOD REASON: HARDLY EVER
I HAVE BEEN ABLE TO LAUGH AND SEE THE FUNNY SIDE OF THINGS: AS MUCH AS I ALWAYS COULD
I HAVE FELT SAD OR MISERABLE: NO, NOT AT ALL

## 2024-08-21 ASSESSMENT — PATIENT HEALTH QUESTIONNAIRE - PHQ9
2. FEELING DOWN, DEPRESSED OR HOPELESS: NOT AT ALL
1. LITTLE INTEREST OR PLEASURE IN DOING THINGS: NOT AT ALL
SUM OF ALL RESPONSES TO PHQ9 QUESTIONS 1 & 2: 0

## 2024-08-21 NOTE — PROGRESS NOTES
Subjective   38 y.o.  presenting for postpartum follow-up   Delivery Date: 24  Type of Delivery: This patient has no babies on file.   Intrapartum complications: primary  section for NRFHR  Problem List       No episode was linked to this visit.              PP Recovery:   Pain: controlled  Perineal discomfort: none  Lochia: none  Feeding Method: She is bottle feeding.   Lactation Consult Needed?: No  Birth Trauma: No  Bonding with Baby: well with baby girlCammie  Sexual Intimacy: No  Contraceptive Method: POP  Depression - Denies s/s depression patient recently seen  and was suffering from PPD and PPA. EPDS at that time 20. Patient started on Zoloft and reports feeling much better now.     Postpartum Depression: Low Risk  (2024)    [place]  Depression Scale     Last EPDS Total Score: 4     Last EPDS Self Harm Result: Never   Recent Concern: Postpartum Depression - High Risk (2024)    [place]  Depression Scale     Last EPDS Total Score: 20     Last EPDS Self Harm Result: Never     Emotional Support - SO and mother   Bowel Symptoms - Negative for abdominal discomfort, blood in stool or black stool, and negative change in bowel habits.  Bladder Symptoms - No dysuria, denies hematuria, urinary frequency, urinary urgency or incontinence.   Menses Since Delivery - Resumed/not: yes    Physical Exam  HENT:      Mouth/Throat:      Mouth: Mucous membranes are moist.   Eyes:      Conjunctiva/sclera: Conjunctivae normal.   Neck:      Thyroid: No thyroid mass, thyromegaly or thyroid tenderness.   Cardiovascular:      Rate and Rhythm: Normal rate and regular rhythm.      Pulses: Normal pulses.   Pulmonary:      Effort: Pulmonary effort is normal.      Breath sounds: Normal breath sounds.   Chest:   Breasts:     Breasts are symmetrical.      Right: Normal.      Left: Normal.   Abdominal:      General: Abdomen is flat.      Palpations: Abdomen is soft.      Hernia: There is  no hernia in the left inguinal area or right inguinal area.          Comments: Incision healed   Genitourinary:     General: Normal vulva.      Cervix: Normal.      Uterus: Normal.       Adnexa: Right adnexa normal and left adnexa normal.   Musculoskeletal:         General: Normal range of motion.      Cervical back: Normal range of motion.   Lymphadenopathy:      Cervical: No cervical adenopathy.      Right cervical: No superficial, deep or posterior cervical adenopathy.     Left cervical: No superficial, deep or posterior cervical adenopathy.      Lower Body: No right inguinal adenopathy. No left inguinal adenopathy.   Skin:     General: Skin is warm.      Capillary Refill: Capillary refill takes less than 2 seconds.   Neurological:      Mental Status: She is alert and oriented to person, place, and time.   Psychiatric:         Mood and Affect: Mood normal.         Behavior: Behavior normal.          Plan      A/P. 38 y.o. now , s/p  section, normal recovery course  Last pap:   Postpartum contraception discussed - patient elects POPs as she is not a candidate for estrogen due to a hx  of migraine with aura.   - Patient informed it is essential that the pill be taken at the same time each day to maximize contraceptive efficacy. Patient aware that if she misses a dose by more than three hours, she must use additional contraception (condoms) for 48 hours after the late dose. Patient aware that POPs are taken continuously with no hormone-free days. Patient educated on the possible side effect of irregular bleeding that typically improves within  6-12 months.   - May start taking pill today, and should use abstinence/condoms for the first 3 days of use.   - Patient aware of all instructions and consents to starting this method. Patient encouraged to read information packet and be compliant with daily use.  Returning to exercise and sex discussed. Patient is cleared. Strengthening pelvic floor  wi  Encouraged patient to continue to monitor for signs or symptoms of  mood and anxiety disorders and follow up with DMITRY Kumar as scheduled.   Routine follow up with PCP for health maintenance examination encouraged including TSH, cholesterol and vitamin D evaluation.  Encouraged continued breastfeeding. Patient aware resources are available should she need them.   Warning s/s discussed  All questions answered    F/U as needed and for routine annual exam     Marii Moon, ROME-JOSHUA

## 2024-09-19 ENCOUNTER — APPOINTMENT (OUTPATIENT)
Dept: BEHAVIORAL HEALTH | Facility: CLINIC | Age: 39
End: 2024-09-19
Payer: COMMERCIAL

## 2024-11-15 ENCOUNTER — TELEPHONE (OUTPATIENT)
Dept: PRIMARY CARE | Facility: CLINIC | Age: 39
End: 2024-11-15

## 2024-11-15 ENCOUNTER — APPOINTMENT (OUTPATIENT)
Dept: PRIMARY CARE | Facility: CLINIC | Age: 39
End: 2024-11-15
Payer: COMMERCIAL

## 2024-11-15 DIAGNOSIS — J01.90 ACUTE SINUSITIS, RECURRENCE NOT SPECIFIED, UNSPECIFIED LOCATION: Primary | ICD-10-CM

## 2024-11-15 RX ORDER — AZITHROMYCIN 250 MG/1
TABLET, FILM COATED ORAL
Qty: 6 TABLET | Refills: 0 | Status: SHIPPED | OUTPATIENT
Start: 2024-11-15 | End: 2024-11-20

## 2024-11-15 ASSESSMENT — ENCOUNTER SYMPTOMS
COUGH: 1
RHINORRHEA: 1
HEADACHES: 1
SORE THROAT: 1

## 2024-11-15 NOTE — TELEPHONE ENCOUNTER
Current sx: sorethroat, cough, back pain from cough, congestion/ sinuses. Started Tues with cough. Temp on Weds 103, fever, chills, . Thurs am no flu sx resolved, still congested, cough, and sore throat. Bringing up green phlegm. Is aking robuitussin with no relief.    COVID on Wed and this morn both neg.    Pharmacy on file.    Please advise.

## 2024-11-18 DIAGNOSIS — R05.9 COUGH, UNSPECIFIED TYPE: ICD-10-CM

## 2024-11-18 RX ORDER — BENZONATATE 200 MG/1
200 CAPSULE ORAL 3 TIMES DAILY PRN
Qty: 42 CAPSULE | Refills: 0 | Status: SHIPPED | OUTPATIENT
Start: 2024-11-18 | End: 2024-12-18

## 2024-11-21 DIAGNOSIS — J06.9 UPPER RESPIRATORY TRACT INFECTION, UNSPECIFIED TYPE: ICD-10-CM

## 2024-11-21 RX ORDER — CEFUROXIME AXETIL 500 MG/1
500 TABLET ORAL 2 TIMES DAILY
Qty: 20 TABLET | Refills: 0 | Status: SHIPPED | OUTPATIENT
Start: 2024-11-21 | End: 2024-12-01

## 2025-04-21 ENCOUNTER — APPOINTMENT (OUTPATIENT)
Dept: OBSTETRICS AND GYNECOLOGY | Facility: CLINIC | Age: 40
End: 2025-04-21
Payer: COMMERCIAL

## 2025-05-29 ENCOUNTER — APPOINTMENT (OUTPATIENT)
Dept: OBSTETRICS AND GYNECOLOGY | Facility: CLINIC | Age: 40
End: 2025-05-29
Payer: COMMERCIAL

## 2025-05-29 VITALS
DIASTOLIC BLOOD PRESSURE: 87 MMHG | SYSTOLIC BLOOD PRESSURE: 124 MMHG | HEIGHT: 60 IN | BODY MASS INDEX: 39.19 KG/M2 | WEIGHT: 199.6 LBS

## 2025-05-29 DIAGNOSIS — N94.10 DYSPAREUNIA, FEMALE: Primary | ICD-10-CM

## 2025-05-29 PROCEDURE — 99395 PREV VISIT EST AGE 18-39: CPT | Performed by: ADVANCED PRACTICE MIDWIFE

## 2025-05-29 PROCEDURE — 1036F TOBACCO NON-USER: CPT | Performed by: ADVANCED PRACTICE MIDWIFE

## 2025-05-29 PROCEDURE — 3008F BODY MASS INDEX DOCD: CPT | Performed by: ADVANCED PRACTICE MIDWIFE

## 2025-05-29 RX ORDER — ESTRADIOL 0.1 MG/G
2 CREAM VAGINAL NIGHTLY
Qty: 42.5 G | Refills: 11 | Status: SHIPPED | OUTPATIENT
Start: 2025-05-29 | End: 2026-05-29

## 2025-05-29 ASSESSMENT — PATIENT HEALTH QUESTIONNAIRE - PHQ9
1. LITTLE INTEREST OR PLEASURE IN DOING THINGS: NOT AT ALL
SUM OF ALL RESPONSES TO PHQ9 QUESTIONS 1 AND 2: 0
2. FEELING DOWN, DEPRESSED OR HOPELESS: NOT AT ALL

## 2025-05-29 NOTE — PROGRESS NOTES
Subjective   Teodora Allen is a 39 y.o. female who is here for a routine well woman exam.     Concerns today:  none    Patient's last menstrual period was 2025.   Periods are regular every 28-30 days, lasting 5 days.   Dysmenorrhea:mild, occurring premenstrually and first 1-2 days of flow.   Cyclic symptoms include pelvic pain.     Sexual Activity: sexually active, male partners; Patient reports 1 partners in the last 12 months.  Pain with intercourse? 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  Loss of desire? Somewhat, getting better now that she has a routine, feeling more like herself again, daughter about to be 2yo  Able to have an orgasm? Yes   Concern for STI exposure?:  No     History of prior STI: abnormal Pap    Current contraception: condoms and rhythm method    Last pap:   History of abnormal Pap smear: yes - colpo 10+ yrs ago, normal since  Treatment for cervical dysplasia: no  Received HPV vaccine series?:  Yes     Family history of breast cancer or ovarian cancer: no    Menstrual History:  OB History          1    Para   1    Term   1       0    AB   0    Living   1         SAB   0    IAB   0    Ectopic   0    Multiple   0    Live Births   1                Menarche age: 15  Patient's last menstrual period was 2025.         Objective   /87 (BP Location: Left arm, Patient Position: Sitting, BP Cuff Size: Large adult)   Ht 1.524 m (5')   Wt 90.5 kg (199 lb 9.6 oz)   LMP 2025   Breastfeeding No   BMI 38.98 kg/m²     Physical Exam  Constitutional:       Appearance: Normal appearance.   HENT:      Head: Normocephalic.      Nose: Nose normal.      Mouth/Throat:      Mouth: Mucous membranes are moist.      Pharynx: Oropharynx is clear.   Eyes:      Pupils: Pupils are equal, round, and reactive to light.   Cardiovascular:      Rate and Rhythm: Normal  rate and regular rhythm.   Pulmonary:      Effort: Pulmonary effort is normal.      Breath sounds: Normal breath sounds.   Chest:   Breasts:     Right: Normal. No mass, nipple discharge, skin change or tenderness.      Left: Normal. No mass, nipple discharge, skin change or tenderness.   Abdominal:      General: Abdomen is flat.      Palpations: Abdomen is soft.   Genitourinary:     General: Normal vulva.      Vagina: Normal.      Cervix: Normal.   Musculoskeletal:         General: Normal range of motion.      Cervical back: Normal range of motion and neck supple.   Skin:     General: Skin is warm and dry.   Neurological:      Mental Status: She is alert.   Psychiatric:         Mood and Affect: Mood normal.         Behavior: Behavior normal.          Assessment/Plan   Normal well woman exam  Continue healthy lifestyle habits  Consider taking a multivitamin daily  Continue recommended women's health screenings  Pap UTD, due 2028  Dyspareunia  Discussed estrogen cream, would like to try, prescription sent, instructed on use  Discussed PFPT, declines referral for now but will consider    Return to care for annual exam or sooner as needed.    Sheila Zuluaga, ROME-JOSHUA

## 2025-08-01 ENCOUNTER — APPOINTMENT (OUTPATIENT)
Dept: PRIMARY CARE | Facility: CLINIC | Age: 40
End: 2025-08-01
Payer: COMMERCIAL

## 2025-08-01 VITALS
BODY MASS INDEX: 38.87 KG/M2 | DIASTOLIC BLOOD PRESSURE: 72 MMHG | TEMPERATURE: 96.3 F | SYSTOLIC BLOOD PRESSURE: 118 MMHG | WEIGHT: 198 LBS | HEART RATE: 68 BPM | HEIGHT: 60 IN | OXYGEN SATURATION: 98 %

## 2025-08-01 DIAGNOSIS — G44.209 TENSION HEADACHE: ICD-10-CM

## 2025-08-01 DIAGNOSIS — E78.2 MIXED HYPERLIPIDEMIA: ICD-10-CM

## 2025-08-01 DIAGNOSIS — R53.83 FATIGUE, UNSPECIFIED TYPE: ICD-10-CM

## 2025-08-01 DIAGNOSIS — Z00.00 ANNUAL PHYSICAL EXAM: Primary | ICD-10-CM

## 2025-08-01 PROCEDURE — 3008F BODY MASS INDEX DOCD: CPT | Performed by: INTERNAL MEDICINE

## 2025-08-01 PROCEDURE — 99395 PREV VISIT EST AGE 18-39: CPT | Performed by: INTERNAL MEDICINE

## 2025-08-01 RX ORDER — NORTRIPTYLINE HYDROCHLORIDE 10 MG/1
20 CAPSULE ORAL NIGHTLY
Qty: 60 CAPSULE | Refills: 5 | Status: SHIPPED | OUTPATIENT
Start: 2025-08-01 | End: 2026-01-28

## 2025-08-01 ASSESSMENT — ANXIETY QUESTIONNAIRES
6. BECOMING EASILY ANNOYED OR IRRITABLE: NOT AT ALL
1. FEELING NERVOUS, ANXIOUS, OR ON EDGE: NOT AT ALL
7. FEELING AFRAID AS IF SOMETHING AWFUL MIGHT HAPPEN: NOT AT ALL
2. NOT BEING ABLE TO STOP OR CONTROL WORRYING: NOT AT ALL
3. WORRYING TOO MUCH ABOUT DIFFERENT THINGS: NOT AT ALL
4. TROUBLE RELAXING: NOT AT ALL
IF YOU CHECKED OFF ANY PROBLEMS ON THIS QUESTIONNAIRE, HOW DIFFICULT HAVE THESE PROBLEMS MADE IT FOR YOU TO DO YOUR WORK, TAKE CARE OF THINGS AT HOME, OR GET ALONG WITH OTHER PEOPLE: NOT DIFFICULT AT ALL
GAD7 TOTAL SCORE: 0
5. BEING SO RESTLESS THAT IT IS HARD TO SIT STILL: NOT AT ALL

## 2025-08-01 ASSESSMENT — ENCOUNTER SYMPTOMS
ENDOCRINE NEGATIVE: 1
CONSTITUTIONAL NEGATIVE: 1
HEADACHES: 1
ROS GI COMMENTS: IBS
RESPIRATORY NEGATIVE: 1
EYES NEGATIVE: 1

## 2025-08-01 NOTE — PROGRESS NOTES
Subjective   Patient ID: Teodora Allen is a 39 y.o. female who presents for Annual Exam (Patient here for annual physical ).    Patient here for wellness exam she has been having headaches which are a mixture of tension and cluster headaches occasionally she gets migraine on the right side behind the right eye if she gets cluster migraines.  She uses Tylenol and has to use it about 10 days a month and she could have also medication overuse headache which we discussed she also had postpartum depression and was on antidepressants she gets anxious she is being a new parent she has chronic anxiety she also has IBS which is chronic         Review of Systems   Constitutional: Negative.    HENT: Negative.     Eyes: Negative.    Respiratory: Negative.     Gastrointestinal:         IBS   Endocrine: Negative.    Genitourinary: Negative.    Neurological:  Positive for headaches.   All other systems reviewed and are negative.      Objective   /72 (BP Location: Left arm, Patient Position: Sitting, BP Cuff Size: Large adult)   Pulse 68   Temp 35.7 °C (96.3 °F) (Temporal)   Ht (!) 1.524 m (5')   Wt 89.8 kg (198 lb)   LMP  (LMP Unknown)   SpO2 98%   BMI 38.67 kg/m²     Physical Exam  Vitals reviewed.   Constitutional:       Appearance: Normal appearance.   HENT:      Head: Normocephalic and atraumatic.     Cardiovascular:      Rate and Rhythm: Normal rate and regular rhythm.      Pulses: Normal pulses.      Heart sounds: Normal heart sounds.   Pulmonary:      Effort: Pulmonary effort is normal.      Breath sounds: Normal breath sounds.     Musculoskeletal:      Right lower leg: No edema.      Left lower leg: No edema.     Neurological:      General: No focal deficit present.      Mental Status: She is alert and oriented to person, place, and time.         Assessment/Plan   Problem List Items Addressed This Visit    None  Visit Diagnoses         Codes      Annual physical exam    -  Primary Z00.00      Tension  headache     G44.209    Relevant Medications    nortriptyline (Pamelor) 10 mg capsule      Mixed hyperlipidemia     E78.2    Relevant Orders    Cholesterol, LDL Direct    Comprehensive Metabolic Panel    Lipid Panel      Fatigue, unspecified type     R53.83    Relevant Orders    CBC and Auto Differential    TSH with reflex to Free T4 if abnormal        Patient should maintain headache journal and see what the trigger factors are we can identify goals and work on them blood work ordered eluding CBC CMP lipids TSH.  For prophylaxis against headaches she will be tried on nortriptyline 10 mg and increase the dose as required by 10 mg every week.  Short-term follow-up 2 months suggested.  For her migraines which happens only twice a month we can use triptans.  Based on her response she may need other medications for prophylaxis and management of headaches.

## 2025-10-03 ENCOUNTER — APPOINTMENT (OUTPATIENT)
Dept: PRIMARY CARE | Facility: CLINIC | Age: 40
End: 2025-10-03
Payer: COMMERCIAL

## 2026-06-08 ENCOUNTER — APPOINTMENT (OUTPATIENT)
Dept: OBSTETRICS AND GYNECOLOGY | Facility: CLINIC | Age: 41
End: 2026-06-08
Payer: COMMERCIAL